# Patient Record
Sex: FEMALE | Race: WHITE | NOT HISPANIC OR LATINO | URBAN - METROPOLITAN AREA
[De-identification: names, ages, dates, MRNs, and addresses within clinical notes are randomized per-mention and may not be internally consistent; named-entity substitution may affect disease eponyms.]

---

## 2018-10-29 ENCOUNTER — HISTORICAL (OUTPATIENT)
Dept: RADIOLOGY | Facility: HOSPITAL | Age: 48
End: 2018-10-29

## 2018-12-28 ENCOUNTER — HISTORICAL (OUTPATIENT)
Dept: RADIOLOGY | Facility: HOSPITAL | Age: 48
End: 2018-12-28

## 2020-12-18 ENCOUNTER — HISTORICAL (OUTPATIENT)
Dept: RADIOLOGY | Facility: HOSPITAL | Age: 50
End: 2020-12-18

## 2020-12-24 LAB
ABS NEUT (OLG): 2.8 X10(3)/MCL (ref 1.5–6.9)
B-HCG SERPL QL: NEGATIVE
BUN SERPL-MCNC: 15 MG/DL (ref 9.8–20.1)
CALCIUM SERPL-MCNC: 9.1 MG/DL (ref 8.4–10.2)
CHLORIDE SERPL-SCNC: 104 MMOL/L (ref 98–107)
CO2 SERPL-SCNC: 26 MMOL/L (ref 22–29)
CREAT SERPL-MCNC: 0.72 MG/DL (ref 0.55–1.02)
CREAT/UREA NIT SERPL: 21
ERYTHROCYTE [DISTWIDTH] IN BLOOD BY AUTOMATED COUNT: 12.6 % (ref 11.5–17)
GLUCOSE SERPL-MCNC: 88 MG/DL (ref 74–100)
GROUP & RH: NORMAL
HCT VFR BLD AUTO: 40 % (ref 36–48)
HGB BLD-MCNC: 12.9 GM/DL (ref 12–16)
MCH RBC QN AUTO: 29 PG (ref 27–34)
MCHC RBC AUTO-ENTMCNC: 32 GM/DL (ref 31–36)
MCV RBC AUTO: 90 FL (ref 80–99)
PLATELET # BLD AUTO: 265 X10(3)/MCL (ref 140–400)
PMV BLD AUTO: 9.8 FL (ref 6.8–10)
POTASSIUM SERPL-SCNC: 3.7 MMOL/L (ref 3.5–5.1)
RBC # BLD AUTO: 4.46 X10(6)/MCL (ref 4.2–5.4)
SARS-COV-2 RNA RESP QL NAA+PROBE: NOT DETECTED
SODIUM SERPL-SCNC: 138 MMOL/L (ref 136–145)
WBC # SPEC AUTO: 5.1 X10(3)/MCL (ref 4.5–11.5)

## 2020-12-29 ENCOUNTER — HISTORICAL (OUTPATIENT)
Dept: ANESTHESIOLOGY | Facility: HOSPITAL | Age: 50
End: 2020-12-29

## 2020-12-29 LAB — B-HCG SERPL QL: NEGATIVE

## 2022-01-04 ENCOUNTER — HISTORICAL (OUTPATIENT)
Dept: RADIOLOGY | Facility: HOSPITAL | Age: 52
End: 2022-01-04

## 2022-01-06 ENCOUNTER — HISTORICAL (OUTPATIENT)
Dept: RADIOLOGY | Facility: HOSPITAL | Age: 52
End: 2022-01-06

## 2022-04-30 NOTE — OP NOTE
Patient:   Trang Shelton             MRN: [id number]            FIN: [id number]               Age:   50 years     Sex:  Female     :  1970   Associated Diagnoses:   None   Author:   Samantha Kelley MD      Operative Note   Operative Information   Date/ Time:  2020 07:22:00.     Procedures Performed: D&C/Hysteroscopy with Novasure Ablation.     Preoperative Diagnosis: MMR  Endometrial polyp.     Postoperative Diagnosis: same.     Surgeon: Samantha Kelley MD.     Anesthesia: General IV sedation.     Speciman Removed: endometrial curretings.     Description of Procedure/Findings/    Complications: The patient was taken to the operating room where general iv sedation anesthesia was obtained without difficulty.  She was prepped and draped in the usual sterile fashion in the dorsal lithotomy position.  The bladder was drained with a red rubber catheter.  At this time, a bivalve speculum was placed and the anterior lip of the cervix was grasped with single toothed tenaculum.  The uterus was sounded to 8 cm.  The cervical length was found to be 4 cm once resistance was lost with a Yuri dilator, giving us a cavity length of 4 cm.  At this time, the Yuri dilators were used to dilate the cervix up to a #16 which would accept the diagnostic hysteroscope.  The hysteroscope was then introduced into the endocervical canal to inspect the endometrial lining.  The insufflations fluid used at this time was sterile water.  The hysteroscope was removed.  Sharp curettage was performed.  At this time, the Novasure apparatus was brought to the table.  The device was then set and inserted in line with the axis of the uterus to the fundus.   The apparatus was then deployed and seated with the cavity width measured 3.1 cm.  All the measurements obtained were input in the NovaSure generator.  At this time, the machine was tested for CO2 gas leak and there was no evidence of a leak at this time. The machine was enabled  and the start button was started and then for 2 minutes a power of 68watts ablated the endometrial cavity.  The apparatus was unlocked and removed.  The patient tolerated the procedure well and was transferred to recovery in stable condition.   .     Esimated blood loss: loss less than  50  cc.     Findings: endometrial thickness with polypoid formation.     Complications: None.

## 2023-01-27 ENCOUNTER — HOSPITAL ENCOUNTER (OUTPATIENT)
Dept: RADIOLOGY | Facility: HOSPITAL | Age: 53
Discharge: HOME | End: 2023-01-27
Attending: OBSTETRICS & GYNECOLOGY
Payer: COMMERCIAL

## 2023-01-27 DIAGNOSIS — Z12.31 ENCOUNTER FOR SCREENING MAMMOGRAM FOR BREAST CANCER: ICD-10-CM

## 2023-01-27 PROCEDURE — 77063 BREAST TOMOSYNTHESIS BI: CPT | Mod: 26,,, | Performed by: STUDENT IN AN ORGANIZED HEALTH CARE EDUCATION/TRAINING PROGRAM

## 2023-01-27 PROCEDURE — 77067 SCR MAMMO BI INCL CAD: CPT | Mod: TC

## 2023-01-27 PROCEDURE — 77067 SCR MAMMO BI INCL CAD: CPT | Mod: 26,,, | Performed by: STUDENT IN AN ORGANIZED HEALTH CARE EDUCATION/TRAINING PROGRAM

## 2023-01-27 PROCEDURE — 77063 MAMMO DIGITAL SCREENING BILAT WITH TOMO: ICD-10-PCS | Mod: 26,,, | Performed by: STUDENT IN AN ORGANIZED HEALTH CARE EDUCATION/TRAINING PROGRAM

## 2023-01-27 PROCEDURE — 77067 MAMMO DIGITAL SCREENING BILAT WITH TOMO: ICD-10-PCS | Mod: 26,,, | Performed by: STUDENT IN AN ORGANIZED HEALTH CARE EDUCATION/TRAINING PROGRAM

## 2024-01-30 DIAGNOSIS — Z12.31 ENCOUNTER FOR SCREENING MAMMOGRAM FOR MALIGNANT NEOPLASM OF BREAST: Primary | ICD-10-CM

## 2024-01-31 ENCOUNTER — HOSPITAL ENCOUNTER (OUTPATIENT)
Dept: RADIOLOGY | Facility: HOSPITAL | Age: 54
Discharge: HOME | End: 2024-01-31
Attending: OBSTETRICS & GYNECOLOGY
Payer: COMMERCIAL

## 2024-01-31 DIAGNOSIS — Z12.31 ENCOUNTER FOR SCREENING MAMMOGRAM FOR MALIGNANT NEOPLASM OF BREAST: ICD-10-CM

## 2024-01-31 PROCEDURE — 77067 SCR MAMMO BI INCL CAD: CPT | Mod: TC

## 2024-01-31 PROCEDURE — 77067 SCR MAMMO BI INCL CAD: CPT | Mod: 26,,, | Performed by: RADIOLOGY

## 2024-01-31 PROCEDURE — 77063 BREAST TOMOSYNTHESIS BI: CPT | Mod: 26,,, | Performed by: RADIOLOGY

## 2024-04-09 ENCOUNTER — HOSPITAL ENCOUNTER (EMERGENCY)
Facility: HOSPITAL | Age: 54
Discharge: HOME | End: 2024-04-09
Attending: INTERNAL MEDICINE
Payer: COMMERCIAL

## 2024-04-09 VITALS
WEIGHT: 137 LBS | SYSTOLIC BLOOD PRESSURE: 135 MMHG | DIASTOLIC BLOOD PRESSURE: 82 MMHG | HEIGHT: 67 IN | TEMPERATURE: 99 F | HEART RATE: 64 BPM | RESPIRATION RATE: 16 BRPM | BODY MASS INDEX: 21.5 KG/M2 | OXYGEN SATURATION: 95 %

## 2024-04-09 DIAGNOSIS — N20.1 URETEROLITHIASIS: Primary | ICD-10-CM

## 2024-04-09 DIAGNOSIS — R10.9 RIGHT FLANK PAIN: ICD-10-CM

## 2024-04-09 DIAGNOSIS — N20.0 KIDNEY STONE: ICD-10-CM

## 2024-04-09 LAB
ALBUMIN SERPL-MCNC: 3.9 G/DL (ref 3.5–5)
ALBUMIN/GLOB SERPL: 1.3 RATIO (ref 1.1–2)
ALP SERPL-CCNC: 121 UNIT/L (ref 40–150)
ALT SERPL-CCNC: 20 UNIT/L (ref 0–55)
APPEARANCE UR: CLEAR
AST SERPL-CCNC: 22 UNIT/L (ref 5–34)
BACTERIA #/AREA URNS AUTO: ABNORMAL /HPF
BASOPHILS # BLD AUTO: 0.01 X10(3)/MCL
BASOPHILS NFR BLD AUTO: 0.2 %
BILIRUB SERPL-MCNC: 0.4 MG/DL
BILIRUB UR QL STRIP.AUTO: NEGATIVE
BUN SERPL-MCNC: 22 MG/DL (ref 9.8–20.1)
CALCIUM SERPL-MCNC: 9.9 MG/DL (ref 8.4–10.2)
CHLORIDE SERPL-SCNC: 110 MMOL/L (ref 98–107)
CO2 SERPL-SCNC: 23 MMOL/L (ref 22–29)
COLOR UR AUTO: YELLOW
CREAT SERPL-MCNC: 0.73 MG/DL (ref 0.55–1.02)
EOSINOPHIL # BLD AUTO: 0 X10(3)/MCL (ref 0–0.9)
EOSINOPHIL NFR BLD AUTO: 0 %
ERYTHROCYTE [DISTWIDTH] IN BLOOD BY AUTOMATED COUNT: 12.2 % (ref 11.5–17)
GFR SERPLBLD CREATININE-BSD FMLA CKD-EPI: >60 MLS/MIN/1.73/M2
GLOBULIN SER-MCNC: 3.1 GM/DL (ref 2.4–3.5)
GLUCOSE SERPL-MCNC: 159 MG/DL (ref 74–100)
GLUCOSE UR QL STRIP.AUTO: NEGATIVE
HCT VFR BLD AUTO: 38.4 % (ref 37–47)
HGB BLD-MCNC: 12.5 G/DL (ref 12–16)
IMM GRANULOCYTES # BLD AUTO: 0.04 X10(3)/MCL (ref 0–0.04)
IMM GRANULOCYTES NFR BLD AUTO: 0.8 %
KETONES UR QL STRIP.AUTO: NEGATIVE
LACTATE SERPL-SCNC: 1.5 MMOL/L (ref 0.5–2.2)
LEUKOCYTE ESTERASE UR QL STRIP.AUTO: NEGATIVE
LYMPHOCYTES # BLD AUTO: 0.61 X10(3)/MCL (ref 0.6–4.6)
LYMPHOCYTES NFR BLD AUTO: 11.5 %
MCH RBC QN AUTO: 28.8 PG (ref 27–31)
MCHC RBC AUTO-ENTMCNC: 32.6 G/DL (ref 33–36)
MCV RBC AUTO: 88.5 FL (ref 80–94)
MONOCYTES # BLD AUTO: 0.29 X10(3)/MCL (ref 0.1–1.3)
MONOCYTES NFR BLD AUTO: 5.5 %
MUCOUS THREADS URNS QL MICRO: ABNORMAL /LPF
NEUTROPHILS # BLD AUTO: 4.35 X10(3)/MCL (ref 2.1–9.2)
NEUTROPHILS NFR BLD AUTO: 82 %
NITRITE UR QL STRIP.AUTO: NEGATIVE
PH UR STRIP.AUTO: 5.5 [PH]
PLATELET # BLD AUTO: 217 X10(3)/MCL (ref 130–400)
PMV BLD AUTO: 10 FL (ref 7.4–10.4)
POTASSIUM SERPL-SCNC: 4.2 MMOL/L (ref 3.5–5.1)
PROT SERPL-MCNC: 7 GM/DL (ref 6.4–8.3)
PROT UR QL STRIP.AUTO: ABNORMAL
RBC # BLD AUTO: 4.34 X10(6)/MCL (ref 4.2–5.4)
RBC #/AREA URNS AUTO: ABNORMAL /HPF
RBC UR QL AUTO: ABNORMAL
SODIUM SERPL-SCNC: 138 MMOL/L (ref 136–145)
SP GR UR STRIP.AUTO: >=1.03 (ref 1–1.03)
SQUAMOUS #/AREA URNS AUTO: ABNORMAL /HPF
UROBILINOGEN UR STRIP-ACNC: 0.2
WBC # SPEC AUTO: 5.3 X10(3)/MCL (ref 4.5–11.5)
WBC #/AREA URNS AUTO: ABNORMAL /HPF

## 2024-04-09 PROCEDURE — 25000003 PHARM REV CODE 250: Performed by: INTERNAL MEDICINE

## 2024-04-09 PROCEDURE — 63600175 PHARM REV CODE 636 W HCPCS: Performed by: INTERNAL MEDICINE

## 2024-04-09 PROCEDURE — 80053 COMPREHEN METABOLIC PANEL: CPT | Performed by: INTERNAL MEDICINE

## 2024-04-09 PROCEDURE — 96361 HYDRATE IV INFUSION ADD-ON: CPT

## 2024-04-09 PROCEDURE — 81001 URINALYSIS AUTO W/SCOPE: CPT | Performed by: INTERNAL MEDICINE

## 2024-04-09 PROCEDURE — 96374 THER/PROPH/DIAG INJ IV PUSH: CPT

## 2024-04-09 PROCEDURE — 99285 EMERGENCY DEPT VISIT HI MDM: CPT | Mod: 25

## 2024-04-09 PROCEDURE — 83605 ASSAY OF LACTIC ACID: CPT | Performed by: INTERNAL MEDICINE

## 2024-04-09 PROCEDURE — 85025 COMPLETE CBC W/AUTO DIFF WBC: CPT | Performed by: INTERNAL MEDICINE

## 2024-04-09 RX ORDER — TAMSULOSIN HYDROCHLORIDE 0.4 MG/1
0.4 CAPSULE ORAL DAILY
Qty: 10 CAPSULE | Refills: 0 | Status: SHIPPED | OUTPATIENT
Start: 2024-04-09 | End: 2024-05-22

## 2024-04-09 RX ORDER — OXYCODONE AND ACETAMINOPHEN 10; 325 MG/1; MG/1
1 TABLET ORAL EVERY 6 HOURS PRN
Qty: 12 TABLET | Refills: 0 | Status: SHIPPED | OUTPATIENT
Start: 2024-04-09 | End: 2024-04-13

## 2024-04-09 RX ORDER — SODIUM CHLORIDE 9 MG/ML
1000 INJECTION, SOLUTION INTRAVENOUS
Status: COMPLETED | OUTPATIENT
Start: 2024-04-09 | End: 2024-04-09

## 2024-04-09 RX ORDER — ONDANSETRON 4 MG/1
4 TABLET, ORALLY DISINTEGRATING ORAL EVERY 6 HOURS PRN
Qty: 20 TABLET | Refills: 0 | Status: SHIPPED | OUTPATIENT
Start: 2024-04-09 | End: 2024-04-14

## 2024-04-09 RX ORDER — TAMSULOSIN HYDROCHLORIDE 0.4 MG/1
0.4 CAPSULE ORAL
Status: COMPLETED | OUTPATIENT
Start: 2024-04-09 | End: 2024-04-09

## 2024-04-09 RX ORDER — KETOROLAC TROMETHAMINE 30 MG/ML
30 INJECTION, SOLUTION INTRAMUSCULAR; INTRAVENOUS
Status: COMPLETED | OUTPATIENT
Start: 2024-04-09 | End: 2024-04-09

## 2024-04-09 RX ORDER — OXYCODONE AND ACETAMINOPHEN 5; 325 MG/1; MG/1
2 TABLET ORAL ONCE
Status: COMPLETED | OUTPATIENT
Start: 2024-04-09 | End: 2024-04-09

## 2024-04-09 RX ADMIN — OXYCODONE HYDROCHLORIDE AND ACETAMINOPHEN 2 TABLET: 5; 325 TABLET ORAL at 02:04

## 2024-04-09 RX ADMIN — SODIUM CHLORIDE 1000 ML: 9 INJECTION, SOLUTION INTRAVENOUS at 01:04

## 2024-04-09 RX ADMIN — TAMSULOSIN HYDROCHLORIDE 0.4 MG: 0.4 CAPSULE ORAL at 02:04

## 2024-04-09 RX ADMIN — KETOROLAC TROMETHAMINE 30 MG: 30 INJECTION, SOLUTION INTRAMUSCULAR at 01:04

## 2024-04-09 NOTE — ED PROVIDER NOTES
Encounter Date: 4/9/2024       History     Chief Complaint   Patient presents with    Flank Pain     Pain started this am. Seen by Tiana CANO this morning and the pain is getting worse.      53-year-old white female with right flank pain.  She was seen in my clinic this morning he was treated for URI and urine was checked at that time which showed no bacteria however she would some blood in her urine and the question was she developing kidney stone however she would more respiratory symptoms so she was treated with a URI sent home and told to come to emergency department if she worsens in any way she woke up around midnight stating that the pain continued to get so bad she could not get comfortable so she came to the ER for evaluation      Review of patient's allergies indicates:  No Known Allergies  History reviewed. No pertinent past medical history.  History reviewed. No pertinent surgical history.  History reviewed. No pertinent family history.     Review of Systems   Constitutional: Negative.  Negative for activity change, appetite change, chills, diaphoresis, fatigue, fever and unexpected weight change.   HENT: Negative.  Negative for congestion, dental problem, drooling, ear discharge, ear pain, facial swelling, hearing loss, mouth sores, nosebleeds, postnasal drip, rhinorrhea, sinus pressure, sinus pain, sneezing, sore throat, tinnitus, trouble swallowing and voice change.    Eyes: Negative.  Negative for photophobia, pain, discharge, redness, itching and visual disturbance.   Respiratory: Negative.  Negative for apnea, cough, choking, chest tightness, shortness of breath, wheezing and stridor.    Cardiovascular: Negative.  Negative for chest pain, palpitations and leg swelling.   Gastrointestinal: Negative.  Negative for abdominal distention, abdominal pain, anal bleeding, blood in stool, constipation, diarrhea, nausea, rectal pain and vomiting.   Endocrine: Negative.  Negative for cold intolerance, heat  intolerance, polydipsia, polyphagia and polyuria.   Genitourinary:  Positive for flank pain. Negative for decreased urine volume, difficulty urinating, dyspareunia, dysuria, enuresis, frequency, genital sores, hematuria, menstrual problem, pelvic pain, urgency, vaginal bleeding, vaginal discharge and vaginal pain.   Musculoskeletal:  Negative for arthralgias, back pain, gait problem, joint swelling, myalgias, neck pain and neck stiffness.   Skin: Negative.  Negative for color change, pallor, rash and wound.   Allergic/Immunologic: Negative.  Negative for environmental allergies, food allergies and immunocompromised state.   Neurological: Negative.  Negative for dizziness, tremors, seizures, syncope, facial asymmetry, speech difficulty, weakness, light-headedness, numbness and headaches.   Hematological: Negative.  Negative for adenopathy. Does not bruise/bleed easily.   Psychiatric/Behavioral: Negative.  Negative for agitation, behavioral problems, confusion, decreased concentration, dysphoric mood, hallucinations, self-injury, sleep disturbance and suicidal ideas. The patient is not nervous/anxious and is not hyperactive.    All other systems reviewed and are negative.      Physical Exam     Initial Vitals [04/09/24 0057]   BP Pulse Resp Temp SpO2   (!) 154/80 89 16 98.8 °F (37.1 °C) 97 %      MAP       --         Physical Exam    Nursing note and vitals reviewed.  Constitutional: She appears well-developed and well-nourished. She is not diaphoretic. No distress.   HENT:   Head: Normocephalic and atraumatic.   Mouth/Throat: Oropharynx is clear and moist. No oropharyngeal exudate.   Eyes: Conjunctivae and EOM are normal. Pupils are equal, round, and reactive to light. No scleral icterus.   Neck: Neck supple. No JVD present.   Normal range of motion.  Cardiovascular:  Normal rate, regular rhythm, normal heart sounds and intact distal pulses.     Exam reveals no gallop and no friction rub.       No murmur  heard.  Pulmonary/Chest: Breath sounds normal. No respiratory distress. She has no wheezes. She exhibits no tenderness.   Abdominal: Abdomen is soft. Bowel sounds are normal. She exhibits no distension. There is no abdominal tenderness.     There is no rebound.   Musculoskeletal:         General: Normal range of motion.      Cervical back: Normal range of motion and neck supple.     Neurological: She is alert and oriented to person, place, and time. She has normal strength.   Skin: Skin is warm and dry. Capillary refill takes less than 2 seconds.   Psychiatric: She has a normal mood and affect. Her behavior is normal. Judgment and thought content normal.         ED Course   Procedures  Admission on 04/09/2024   Component Date Value Ref Range Status    Sodium Level 04/09/2024 138  136 - 145 mmol/L Final    Potassium Level 04/09/2024 4.2  3.5 - 5.1 mmol/L Final    Chloride 04/09/2024 110 (H)  98 - 107 mmol/L Final    Carbon Dioxide 04/09/2024 23  22 - 29 mmol/L Final    Glucose Level 04/09/2024 159 (H)  74 - 100 mg/dL Final    Blood Urea Nitrogen 04/09/2024 22.0 (H)  9.8 - 20.1 mg/dL Final    Creatinine 04/09/2024 0.73  0.55 - 1.02 mg/dL Final    Calcium Level Total 04/09/2024 9.9  8.4 - 10.2 mg/dL Final    Protein Total 04/09/2024 7.0  6.4 - 8.3 gm/dL Final    Albumin Level 04/09/2024 3.9  3.5 - 5.0 g/dL Final    Globulin 04/09/2024 3.1  2.4 - 3.5 gm/dL Final    Albumin/Globulin Ratio 04/09/2024 1.3  1.1 - 2.0 ratio Final    Bilirubin Total 04/09/2024 0.4  <=1.5 mg/dL Final    Alkaline Phosphatase 04/09/2024 121  40 - 150 unit/L Final    Alanine Aminotransferase 04/09/2024 20  0 - 55 unit/L Final    Aspartate Aminotransferase 04/09/2024 22  5 - 34 unit/L Final    eGFR 04/09/2024 >60  mls/min/1.73/m2 Final    Color, UA 04/09/2024 Yellow  Yellow, Light-Yellow, Dark Yellow, Yina, Straw Final    Appearance, UA 04/09/2024 Clear  Clear Final    Specific Gravity, UA 04/09/2024 >=1.030  1.005 - 1.030 Final    pH, UA  04/09/2024 5.5  5.0 - 8.5 Final    Protein, UA 04/09/2024 1+ (A)  Negative Final    Glucose, UA 04/09/2024 Negative  Negative, Normal Final    Ketones, UA 04/09/2024 Negative  Negative Final    Blood, UA 04/09/2024 2+ (A)  Negative Final    Bilirubin, UA 04/09/2024 Negative  Negative Final    Urobilinogen, UA 04/09/2024 0.2  0.2, 1.0, Normal Final    Nitrites, UA 04/09/2024 Negative  Negative Final    Leukocyte Esterase, UA 04/09/2024 Negative  Negative Final    Lactic Acid Level 04/09/2024 1.5  0.5 - 2.2 mmol/L Final    WBC 04/09/2024 5.30  4.50 - 11.50 x10(3)/mcL Final    RBC 04/09/2024 4.34  4.20 - 5.40 x10(6)/mcL Final    Hgb 04/09/2024 12.5  12.0 - 16.0 g/dL Final    Hct 04/09/2024 38.4  37.0 - 47.0 % Final    MCV 04/09/2024 88.5  80.0 - 94.0 fL Final    MCH 04/09/2024 28.8  27.0 - 31.0 pg Final    MCHC 04/09/2024 32.6 (L)  33.0 - 36.0 g/dL Final    RDW 04/09/2024 12.2  11.5 - 17.0 % Final    Platelet 04/09/2024 217  130 - 400 x10(3)/mcL Final    MPV 04/09/2024 10.0  7.4 - 10.4 fL Final    Neut % 04/09/2024 82.0  % Final    Lymph % 04/09/2024 11.5  % Final    Mono % 04/09/2024 5.5  % Final    Eos % 04/09/2024 0.0  % Final    Basophil % 04/09/2024 0.2  % Final    Lymph # 04/09/2024 0.61  0.6 - 4.6 x10(3)/mcL Final    Neut # 04/09/2024 4.35  2.1 - 9.2 x10(3)/mcL Final    Mono # 04/09/2024 0.29  0.1 - 1.3 x10(3)/mcL Final    Eos # 04/09/2024 0.00  0 - 0.9 x10(3)/mcL Final    Baso # 04/09/2024 0.01  <=0.2 x10(3)/mcL Final    IG# 04/09/2024 0.04  0 - 0.04 x10(3)/mcL Final    IG% 04/09/2024 0.8  % Final    Bacteria, UA 04/09/2024 None Seen  None Seen, Rare, Occasional /HPF Final    Mucous, UA 04/09/2024 Trace (A)  None Seen /LPF Final    RBC, UA 04/09/2024 3-5  None Seen, 0-2, 3-5, 0-5 /HPF Final    WBC, UA 04/09/2024 None Seen  None Seen, 0-2, 3-5, 0-5 /HPF Final    Squamous Epithelial Cells, UA 04/09/2024 Rare  None Seen, Rare, Occasional, Occ /HPF Final       Labs Reviewed   COMPREHENSIVE METABOLIC PANEL -  Abnormal; Notable for the following components:       Result Value    Chloride 110 (*)     Glucose Level 159 (*)     Blood Urea Nitrogen 22.0 (*)     All other components within normal limits   URINALYSIS, REFLEX TO URINE CULTURE - Abnormal; Notable for the following components:    Protein, UA 1+ (*)     Blood, UA 2+ (*)     All other components within normal limits   CBC WITH DIFFERENTIAL - Abnormal; Notable for the following components:    MCHC 32.6 (*)     All other components within normal limits   URINALYSIS, MICROSCOPIC - Abnormal; Notable for the following components:    Mucous, UA Trace (*)     All other components within normal limits   LACTIC ACID, PLASMA - Normal   CBC W/ AUTO DIFFERENTIAL    Narrative:     The following orders were created for panel order CBC auto differential.  Procedure                               Abnormality         Status                     ---------                               -----------         ------                     CBC with Differential[[id number]]       Abnormal            Final result                 Please view results for these tests on the individual orders.          Imaging Results              CT Renal Stone Study ABD Pelvis WO (Preliminary result)  Result time 04/09/24 01:58:24      Preliminary result by Mckay Caro MD (04/09/24 01:58:24)                   Narrative:    START OF REPORT:  Technique: CT of the abdomen and pelvis was performed with axial images as well as sagittal and coronal reconstruction images without intravenous contrast renal stone protocol.    Comparison: None available.    Clinical History: Flank pain.    Dosage Information: Automated Exposure Control was utilized 215.48 mGy.cm.    Findings:  Lines and Tubes: None.  Thorax:  Lungs: There is a 1.9 cm masslike lesion with partially spiculated margins in the right medial basal lung segment seen on series 2, image 22. This is suspicious for a pulmonary neoplasm.  Pleura: No effusions or  thickening.  Heart: The heart size is within normal limits.  Abdomen:  Abdominal Wall: There is a small uncomplicated umbilical hernia which contains mesenteric fat.  Liver: The liver appears unremarkable.  Biliary System: No intrahepatic or extrahepatic biliary duct dilatation is seen.  Gallbladder: The gallbladder appears unremarkable with no stones wall thickening or pericholecystic inflammatory changes or fluid.  Pancreas: The pancreas appears unremarkable.  Spleen: The spleen appears unremarkable.  Adrenals: The adrenal glands appear unremarkable.  Kidneys: Multiple nonobstructive intrarenal kidney stones are seen in the left kidney. The left kidney otherwise appears unremarkable with no cysts masses or hydronephrosis identified. Multiple nonobstructive intrarenal kidney stones are seen in the right kidney. There is mild right hydroureteronephrosis due to a 3.5 mm obstructive stone at the ureterovesical junction seen on image 130, series 2.  Aorta: The abdominal aorta appears unremarkable.  IVC: Unremarkable.  Bowel:  Esophagus: The visualized esophagus appears unremarkable.  Stomach: The stomach appears unremarkable.  Duodenum: Unremarkable appearing duodenum.  Small Bowel: The small bowel appears unremarkable.  Colon: Nondistended.  Appendix: The appendix appears unremarkable and is partially seen on Image 106, Series 2.  Peritoneum: No intraperitoneal free air or ascites is seen.    Pelvis:  Bladder: The bladder is nondistended and cannot be definitively evaluated.  Female:  Uterus: The uterus appears unremarkable for age.  Ovaries: No adnexal masses are seen.    Bony structures:  Dorsal Spine: The visualized dorsal spine appears unremarkable.  Bony Pelvis: The visualized bony structures of the pelvis appear unremarkable.      Impression:  1. There is a 1.9 cm masslike lesion with partially spiculated margins in the right medial basal lung segment seen on series 2, image 22. This is suspicious for a  pulmonary neoplasm. Clinical correlation is recommended as regards additional evaluation and followup.  2. There is mild right hydroureteronephrosis due to a 3.5 mm obstructive stone at the ureterovesical junction seen on image 130, series 2. Clinical correlation and followup until complete interval resolution is recommended.  3. Details and other findings as discussed above.                                         Medications   oxyCODONE-acetaminophen 5-325 mg per tablet 2 tablet (has no administration in time range)   ketorolac injection 30 mg (30 mg Intravenous Given 4/9/24 0116)   0.9%  NaCl infusion (1,000 mLs Intravenous New Bag 4/9/24 0128)     Medical Decision Making  53-year-old white female with right flank pain.  Differential diagnosis is nephrolithiasis, ureterolithiasis, hydronephrosis, urinary tract infection, pyelonephritis, appendicitis, diverticulitis.  Workup included blood work and urinalysis.  Urinalysis did have some blood so she was sent through the CT scanner which time it shows a 3.5 mm stone at the UVJ that appears to be mildly obstructing causing some mild ureteral hydronephrosis.  Patient was notified of these results also the CT scan showed some concerning for possible neoplasm in the right lung and when I discussed this with her she states that while she was in [place] they noted something and did a workup for about a year and was told it was stable but we do not have anything to compare that here and she states she will get copies of those    Problems Addressed:  Kidney stone: acute illness or injury with systemic symptoms  Right flank pain: acute illness or injury  Ureterolithiasis: acute illness or injury with systemic symptoms    Amount and/or Complexity of Data Reviewed  External Data Reviewed: labs and notes.  Labs: ordered. Decision-making details documented in ED Course.  Radiology: ordered. Decision-making details documented in ED Course.    Risk  OTC drugs.  Prescription  drug management.  Parenteral controlled substances.                                      Clinical Impression:  Final diagnoses:  [N20.1] Ureterolithiasis (Primary)  [N20.0] Kidney stone  [R10.9] Right flank pain          ED Disposition Condition    Discharge Stable          ED Prescriptions       Medication Sig Dispense Start Date End Date Auth. Provider    oxyCODONE-acetaminophen (PERCOCET)  mg per tablet Take 1 tablet by mouth every 6 (six) hours as needed for Pain. 12 tablet 4/9/2024 4/13/2024 Russ Betancur MD    tamsulosin (FLOMAX) 0.4 mg Cap Take 1 capsule (0.4 mg total) by mouth once daily. 10 capsule 4/9/2024 4/9/2025 Russ Betancur MD    ondansetron (ZOFRAN-ODT) 4 MG TbDL Take 1 tablet (4 mg total) by mouth every 6 (six) hours as needed. 20 tablet 4/9/2024 4/14/2024 Russ Betancur MD          Follow-up Information       Follow up With Specialties Details Why Contact Info    Russ Betancur MD Internal Medicine In 1 week  [address] [place] [id number]  [phone]              Kellen Carranza is a certified MA and was present during the entire interaction with this patient      Russ Betancur MD  04/09/24 [id number]

## 2024-04-11 ENCOUNTER — HOSPITAL ENCOUNTER (OUTPATIENT)
Dept: RADIOLOGY | Facility: HOSPITAL | Age: 54
Discharge: HOME | End: 2024-04-11
Attending: FAMILY MEDICINE
Payer: COMMERCIAL

## 2024-04-11 DIAGNOSIS — R91.1 SOLITARY PULMONARY NODULE: ICD-10-CM

## 2024-04-11 PROCEDURE — 71260 CT THORAX DX C+: CPT | Mod: TC

## 2024-04-11 PROCEDURE — 25500020 PHARM REV CODE 255: Performed by: FAMILY MEDICINE

## 2024-04-11 RX ADMIN — IOPAMIDOL 100 ML: 755 INJECTION, SOLUTION INTRAVENOUS at 11:04

## 2024-05-22 ENCOUNTER — OFFICE VISIT (OUTPATIENT)
Dept: CARDIAC SURGERY | Facility: CLINIC | Age: 54
End: 2024-05-22
Payer: COMMERCIAL

## 2024-05-22 VITALS
WEIGHT: 135 LBS | BODY MASS INDEX: 21.19 KG/M2 | HEIGHT: 67 IN | SYSTOLIC BLOOD PRESSURE: 132 MMHG | OXYGEN SATURATION: 100 % | DIASTOLIC BLOOD PRESSURE: 80 MMHG | HEART RATE: 71 BPM

## 2024-05-22 DIAGNOSIS — R93.89 ABNORMAL CT SCAN, CHEST: ICD-10-CM

## 2024-05-22 DIAGNOSIS — R91.1 LUNG NODULE: Primary | ICD-10-CM

## 2024-05-22 DIAGNOSIS — Z86.2 HISTORY OF HYPERCOAGULABLE STATE: ICD-10-CM

## 2024-05-22 PROCEDURE — 3079F DIAST BP 80-89 MM HG: CPT | Mod: CPTII,,, | Performed by: THORACIC SURGERY (CARDIOTHORACIC VASCULAR SURGERY)

## 2024-05-22 PROCEDURE — 3008F BODY MASS INDEX DOCD: CPT | Mod: CPTII,,, | Performed by: THORACIC SURGERY (CARDIOTHORACIC VASCULAR SURGERY)

## 2024-05-22 PROCEDURE — 3075F SYST BP GE 130 - 139MM HG: CPT | Mod: CPTII,,, | Performed by: THORACIC SURGERY (CARDIOTHORACIC VASCULAR SURGERY)

## 2024-05-22 PROCEDURE — 1160F RVW MEDS BY RX/DR IN RCRD: CPT | Mod: CPTII,,, | Performed by: THORACIC SURGERY (CARDIOTHORACIC VASCULAR SURGERY)

## 2024-05-22 PROCEDURE — 99204 OFFICE O/P NEW MOD 45 MIN: CPT | Mod: ,,, | Performed by: THORACIC SURGERY (CARDIOTHORACIC VASCULAR SURGERY)

## 2024-05-22 PROCEDURE — 1159F MED LIST DOCD IN RCRD: CPT | Mod: CPTII,,, | Performed by: THORACIC SURGERY (CARDIOTHORACIC VASCULAR SURGERY)

## 2024-05-22 RX ORDER — OMEPRAZOLE 40 MG/1
40 CAPSULE, DELAYED RELEASE ORAL EVERY MORNING
COMMUNITY
Start: 2024-04-17

## 2024-05-22 NOTE — PROGRESS NOTES
"History & Physical    SUBJECTIVE:     History of Present Illness:  The patient is presenting for evaluation for right lower lobe lung mass.  This has been there 11 years ago.  Recent CT was done revealing the same mass.  She was sent to me for possible intervention.  She has history of hypercoagulable disorder and she has not on any blood thinners at that time.  Apparently she only took it around her pregnancy      Chief Complaint   Patient presents with    Pre-op Exam     REFERRAL-DR. VARGAS-RT LOWER LOBE NODULE 2.2 CM (PREV 12 MM 12 YRS AGO). 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       No current facility-administered medications for this visit.       History reviewed. No pertinent past medical history.  Past Surgical History:   Procedure Laterality Date    AUGMENTATION OF BREAST      LASIK       Family History   Problem Relation Name Age of Onset    Lymphoma Mother      Ovarian cancer Paternal Aunt      Stomach cancer Maternal Aunt       Social History     Tobacco Use    Smoking status: Never    Smokeless tobacco: Never        Review of Systems:  Review of Systems   Constitutional: Negative.    HENT: Negative.     Eyes: Negative.    Respiratory: Negative.     Cardiovascular: Negative.    Gastrointestinal: Negative.    Endocrine: Negative.    Genitourinary: Negative.    Musculoskeletal: Negative.         Claudications   Skin: Negative.    Allergic/Immunologic: Negative.    Neurological: Negative.    Hematological: Negative.    Psychiatric/Behavioral: Negative.         OBJECTIVE:     Vital Signs (Most Recent)  Pulse: 71 (05/22/24 0814)  BP: 132/80 (05/22/24 0814)  SpO2: 100 % (05/22/24 0814)  5' 7" (1.702 m)  61.2 kg (135 lb)     Physical Exam:  Physical Exam  Vitals reviewed. "   Constitutional:       Appearance: Normal appearance.   HENT:      Head: Normocephalic and atraumatic.      Nose: Nose normal.      Mouth/Throat:      Mouth: Mucous membranes are dry.      Pharynx: Oropharynx is clear.   Eyes:      Extraocular Movements: Extraocular movements intact.      Conjunctiva/sclera: Conjunctivae normal.      Pupils: Pupils are equal, round, and reactive to light.   Cardiovascular:      Rate and Rhythm: Normal rate and regular rhythm.      Pulses: Normal pulses.   Pulmonary:      Effort: Pulmonary effort is normal.      Breath sounds: Normal breath sounds.   Abdominal:      General: Abdomen is flat.      Palpations: Abdomen is soft.   Musculoskeletal:         General: Normal range of motion.      Cervical back: Neck supple.   Skin:     General: Skin is warm and dry.   Neurological:      General: No focal deficit present.   Psychiatric:         Mood and Affect: Mood normal.         Laboratory:  None      Diagnostic Results:  CTs reviewed from this year and 11 years ago      ASSESSMENT/PLAN:     Right lower lobe lung mass.  Very unlikely to be high-grade cancer at this time.  I believe however that she will benefit from VATS biopsies and in the very unlikely possibility that that is lung cancer right lower lobectomy.  She understands the risks and benefits of the procedure.  She will benefit from pulmonary function tests and a hematology consult prior to the procedure

## 2024-05-24 NOTE — PROGRESS NOTES
"Subjective:       Patient ID: Trang Shelton is a 53 y.o. female.    Chief Complaint: New Patient    Diagnosis: Heterozygous Factor V Leiden    Current Treatment: None    Treatment History: N/A    Interval History:   54 yo presents in May '24 for evaluation of hypercoagulable disorder prior to lung biopsy/possible resection for lung mass. In  after experiencing 3 miscarriages she had a hypercoag workup with  with GYN who told her she was at increased risk of blood clots. She became pregnant through IVF 2x and both pregnancies she took Heparin/Lovenox prophylactically without issue. She has no history of any thrombosis of any kind nor any family history of DVT/PE. She has had previous surgeries including breast impantation and removal without issue or additional anticoagulation. She has never taken anticoagulation outside of pregnancy.     After reviewed of her hypercoag workup done in  she is negative for APLA, Prothrombin, and Antithrombin mutations/abnormalities. She is heterozygous for FVL. She has 2 other genetic abnormalities that reportedly "could be linked to increased thrombosis risk" however this is not clinically significant.     I discussed her CBC findings and how they differ from expected values. We discussed the possible etiology for her findings including both benign and malignant causes. I explained the role for initial evaluation with blood work and possibly secondary evaluation with a bone marrow biopsy. All questions were answered at the time of the visit. She is agreeable to proceed with the plan.      History reviewed. No pertinent past medical history.   Past Surgical History:   Procedure Laterality Date    AUGMENTATION OF BREAST      BREAST SURGERY  2018     SECTION   and     LASIK      TUBAL LIGATION  2009     Social History     Socioeconomic History    Marital status:    Tobacco Use    Smoking status: Never    Smokeless tobacco: Never   Substance and " Sexual Activity    Alcohol use: Never    Drug use: Never    Sexual activity: Yes     Partners: Male     Birth control/protection: Post-menopausal      Family History   Problem Relation Name Age of Onset    Lymphoma Mother Diana Amor     Cancer Mother Diana Amor     Ovarian cancer Paternal Aunt      Stomach cancer Maternal Aunt      Diabetes Father Jorge Amor       Review of patient's allergies indicates:  No Known Allergies   Review of Systems   Constitutional:  Negative for appetite change and unexpected weight change.   HENT:  Negative for mouth sores.    Eyes:  Negative for visual disturbance.   Respiratory:  Negative for cough and shortness of breath.    Cardiovascular:  Negative for chest pain.   Gastrointestinal:  Negative for abdominal pain and diarrhea.   Genitourinary:  Negative for frequency.   Musculoskeletal:  Negative for back pain.   Integumentary:  Negative for rash.   Neurological:  Negative for headaches.   Hematological:  Negative for adenopathy.   Psychiatric/Behavioral:  The patient is not nervous/anxious.          Objective:      Vitals:    05/28/24 0820   BP: 124/80   Pulse: 68   Resp: 18   Temp: 97.5 °F (36.4 °C)       Physical Exam  Constitutional:       General: She is not in acute distress.     Appearance: Normal appearance. She is not ill-appearing.   HENT:      Head: Normocephalic and atraumatic.      Nose: Nose normal.      Mouth/Throat:      Mouth: Mucous membranes are moist.      Pharynx: Oropharynx is clear.   Eyes:      Extraocular Movements: Extraocular movements intact.      Conjunctiva/sclera: Conjunctivae normal.      Pupils: Pupils are equal, round, and reactive to light.   Cardiovascular:      Rate and Rhythm: Normal rate and regular rhythm.      Pulses: Normal pulses.      Heart sounds: Normal heart sounds. No murmur heard.  Pulmonary:      Effort: Pulmonary effort is normal. No respiratory distress.      Breath sounds: Normal breath sounds.   Abdominal:       General: There is no distension.      Palpations: Abdomen is soft.      Tenderness: There is no abdominal tenderness.   Musculoskeletal:         General: Normal range of motion.      Cervical back: Normal range of motion and neck supple.      Right lower leg: No edema.      Left lower leg: No edema.   Lymphadenopathy:      Cervical: No cervical adenopathy.   Skin:     General: Skin is warm and dry.   Neurological:      General: No focal deficit present.      Mental Status: She is alert and oriented to person, place, and time.         LABS AND IMAGING REVIEWED IN EPIC    IMAGIN24 CT Chest:  Impression:  1. 2.2 cm lobulated spiculated nodular focus posteromedial right lower lung.  A neoplastic etiology must be considered  2. Thoracic spondylosis with 8 mm sclerotic nodule at T9  3. 4 mm low-attenuation nodule lower left thyroid lobe    Assessment:   Heterozygous Factor V Leiden - No personal history or family history of thrombosis. Had 3 miscarriages which prompted workup in '07.       Plan:   - Will discuss post op surgical plan with Dr. Clementina Raymond from hematology perspective to undergo surgical procedure, no additional/increased need for anticoagulation. Recommend ambulation as able after procedure.   - RTC 3 weeks MD visit, labs    I spent a total of 55 minutes on the day of the visit.This includes face to face time and non-face to face time preparing to see the patient (eg, review of tests), obtaining and/or reviewing separately obtained history, documenting clinical information in the electronic or other health record, independently interpreting results and communicating results to the patient/family/caregiver, or care coordinator.    Elizabeth Kennedy LeJeune, MD  Hematology/Oncology   [facility]        Professional Services   I, Linda Don LPN, acted solely as a scribe for and in the presence of Dr. Elizabeth Kennedy LeJeune, who performed these services.

## 2024-05-28 ENCOUNTER — TELEPHONE (OUTPATIENT)
Dept: HEMATOLOGY/ONCOLOGY | Facility: CLINIC | Age: 54
End: 2024-05-28
Payer: COMMERCIAL

## 2024-05-28 ENCOUNTER — OFFICE VISIT (OUTPATIENT)
Dept: HEMATOLOGY/ONCOLOGY | Facility: CLINIC | Age: 54
End: 2024-05-28
Payer: COMMERCIAL

## 2024-05-28 VITALS
TEMPERATURE: 98 F | BODY MASS INDEX: 21.43 KG/M2 | SYSTOLIC BLOOD PRESSURE: 124 MMHG | HEIGHT: 67 IN | RESPIRATION RATE: 18 BRPM | WEIGHT: 136.5 LBS | OXYGEN SATURATION: 100 % | DIASTOLIC BLOOD PRESSURE: 80 MMHG | HEART RATE: 68 BPM

## 2024-05-28 DIAGNOSIS — R91.1 LUNG NODULE: ICD-10-CM

## 2024-05-28 DIAGNOSIS — R93.89 ABNORMAL CT SCAN, CHEST: ICD-10-CM

## 2024-05-28 DIAGNOSIS — Z86.2 HISTORY OF HYPERCOAGULABLE STATE: ICD-10-CM

## 2024-05-28 DIAGNOSIS — D68.51 HETEROZYGOUS FACTOR V LEIDEN MUTATION: Primary | ICD-10-CM

## 2024-05-28 PROCEDURE — 1160F RVW MEDS BY RX/DR IN RCRD: CPT | Mod: CPTII,S$GLB,, | Performed by: STUDENT IN AN ORGANIZED HEALTH CARE EDUCATION/TRAINING PROGRAM

## 2024-05-28 PROCEDURE — 1159F MED LIST DOCD IN RCRD: CPT | Mod: CPTII,S$GLB,, | Performed by: STUDENT IN AN ORGANIZED HEALTH CARE EDUCATION/TRAINING PROGRAM

## 2024-05-28 PROCEDURE — 3074F SYST BP LT 130 MM HG: CPT | Mod: CPTII,S$GLB,, | Performed by: STUDENT IN AN ORGANIZED HEALTH CARE EDUCATION/TRAINING PROGRAM

## 2024-05-28 PROCEDURE — 3008F BODY MASS INDEX DOCD: CPT | Mod: CPTII,S$GLB,, | Performed by: STUDENT IN AN ORGANIZED HEALTH CARE EDUCATION/TRAINING PROGRAM

## 2024-05-28 PROCEDURE — 99999 PR PBB SHADOW E&M-EST. PATIENT-LVL IV: CPT | Mod: PBBFAC,,, | Performed by: STUDENT IN AN ORGANIZED HEALTH CARE EDUCATION/TRAINING PROGRAM

## 2024-05-28 PROCEDURE — 99204 OFFICE O/P NEW MOD 45 MIN: CPT | Mod: S$GLB,,, | Performed by: STUDENT IN AN ORGANIZED HEALTH CARE EDUCATION/TRAINING PROGRAM

## 2024-05-28 PROCEDURE — 3079F DIAST BP 80-89 MM HG: CPT | Mod: CPTII,S$GLB,, | Performed by: STUDENT IN AN ORGANIZED HEALTH CARE EDUCATION/TRAINING PROGRAM

## 2024-05-28 NOTE — Clinical Note
Hey, She is cleared from hematology perspective to undergo procedure. Give me a call if you need anything further from me! Thanks!

## 2024-05-29 ENCOUNTER — LAB VISIT (OUTPATIENT)
Dept: LAB | Facility: HOSPITAL | Age: 54
End: 2024-05-29
Attending: STUDENT IN AN ORGANIZED HEALTH CARE EDUCATION/TRAINING PROGRAM
Payer: COMMERCIAL

## 2024-05-29 DIAGNOSIS — Z86.2 HISTORY OF HYPERCOAGULABLE STATE: ICD-10-CM

## 2024-05-29 PROBLEM — D68.51 HETEROZYGOUS FACTOR V LEIDEN MUTATION: Status: ACTIVE | Noted: 2024-05-29

## 2024-05-29 PROBLEM — R91.1 LUNG NODULE: Status: ACTIVE | Noted: 2024-05-29

## 2024-05-29 LAB
ALBUMIN SERPL-MCNC: 4 G/DL (ref 3.5–5)
ALBUMIN/GLOB SERPL: 1.5 RATIO (ref 1.1–2)
ALP SERPL-CCNC: 83 UNIT/L (ref 40–150)
ALT SERPL-CCNC: 8 UNIT/L (ref 0–55)
ANION GAP SERPL CALC-SCNC: 6 MEQ/L
AST SERPL-CCNC: 14 UNIT/L (ref 5–34)
BASOPHILS # BLD AUTO: 0.06 X10(3)/MCL
BASOPHILS NFR BLD AUTO: 1.5 %
BILIRUB SERPL-MCNC: 1.3 MG/DL
BUN SERPL-MCNC: 19 MG/DL (ref 9.8–20.1)
CALCIUM SERPL-MCNC: 9.2 MG/DL (ref 8.4–10.2)
CHLORIDE SERPL-SCNC: 108 MMOL/L (ref 98–107)
CO2 SERPL-SCNC: 27 MMOL/L (ref 22–29)
CREAT SERPL-MCNC: 0.74 MG/DL (ref 0.55–1.02)
CREAT/UREA NIT SERPL: 26
EOSINOPHIL # BLD AUTO: 0.1 X10(3)/MCL (ref 0–0.9)
EOSINOPHIL NFR BLD AUTO: 2.5 %
ERYTHROCYTE [DISTWIDTH] IN BLOOD BY AUTOMATED COUNT: 12.4 % (ref 11.5–17)
GFR SERPLBLD CREATININE-BSD FMLA CKD-EPI: >60 ML/MIN/1.73/M2
GLOBULIN SER-MCNC: 2.6 GM/DL (ref 2.4–3.5)
GLUCOSE SERPL-MCNC: 91 MG/DL (ref 74–100)
HCT VFR BLD AUTO: 37.6 % (ref 37–47)
HGB BLD-MCNC: 12.4 G/DL (ref 12–16)
IMM GRANULOCYTES # BLD AUTO: 0.01 X10(3)/MCL (ref 0–0.04)
IMM GRANULOCYTES NFR BLD AUTO: 0.3 %
LYMPHOCYTES # BLD AUTO: 1.66 X10(3)/MCL (ref 0.6–4.6)
LYMPHOCYTES NFR BLD AUTO: 41.6 %
MCH RBC QN AUTO: 28.9 PG (ref 27–31)
MCHC RBC AUTO-ENTMCNC: 33 G/DL (ref 33–36)
MCV RBC AUTO: 87.6 FL (ref 80–94)
MONOCYTES # BLD AUTO: 0.35 X10(3)/MCL (ref 0.1–1.3)
MONOCYTES NFR BLD AUTO: 8.8 %
NEUTROPHILS # BLD AUTO: 1.81 X10(3)/MCL (ref 2.1–9.2)
NEUTROPHILS NFR BLD AUTO: 45.3 %
PLATELET # BLD AUTO: 222 X10(3)/MCL (ref 130–400)
PMV BLD AUTO: 10.2 FL (ref 7.4–10.4)
POTASSIUM SERPL-SCNC: 3.8 MMOL/L (ref 3.5–5.1)
PROT SERPL-MCNC: 6.6 GM/DL (ref 6.4–8.3)
RBC # BLD AUTO: 4.29 X10(6)/MCL (ref 4.2–5.4)
SODIUM SERPL-SCNC: 141 MMOL/L (ref 136–145)
WBC # SPEC AUTO: 3.99 X10(3)/MCL (ref 4.5–11.5)

## 2024-05-29 PROCEDURE — 80053 COMPREHEN METABOLIC PANEL: CPT

## 2024-05-29 PROCEDURE — 85025 COMPLETE CBC W/AUTO DIFF WBC: CPT

## 2024-05-29 PROCEDURE — 36415 COLL VENOUS BLD VENIPUNCTURE: CPT

## 2024-05-30 ENCOUNTER — TELEPHONE (OUTPATIENT)
Dept: HEMATOLOGY/ONCOLOGY | Facility: CLINIC | Age: 54
End: 2024-05-30
Payer: COMMERCIAL

## 2024-06-05 ENCOUNTER — PROCEDURE VISIT (OUTPATIENT)
Dept: RESPIRATORY THERAPY | Facility: HOSPITAL | Age: 54
End: 2024-06-05
Attending: THORACIC SURGERY (CARDIOTHORACIC VASCULAR SURGERY)
Payer: COMMERCIAL

## 2024-06-05 DIAGNOSIS — Z86.2 HISTORY OF HYPERCOAGULABLE STATE: ICD-10-CM

## 2024-06-05 DIAGNOSIS — R93.89 ABNORMAL CT SCAN, CHEST: ICD-10-CM

## 2024-06-05 DIAGNOSIS — R91.1 LUNG NODULE: ICD-10-CM

## 2024-06-05 LAB
DLCO SINGLE BREATH LLN: 19.75
DLCO SINGLE BREATH PRE REF: 60.9 %
DLCO SINGLE BREATH REF: 25.48
DLCOC SBVA LLN: 3.33
DLCOC SBVA REF: 4.68
DLCOC SINGLE BREATH LLN: 19.75
DLCOC SINGLE BREATH REF: 25.48
DLCOCSBVAULN: 6.04
DLCOCSINGLEBREATHULN: 31.22
DLCOSINGLEBREATHULN: 31.22
DLCOVA LLN: 3.33
DLCOVA PRE REF: 80.4 %
DLCOVA PRE: 3.76 ML/(MIN*MMHG*L) (ref 3.33–6.04)
DLCOVA REF: 4.68
DLCOVAULN: 6.04
ERV LLN: -16449.08
ERV PRE REF: 145.4 %
ERV REF: 0.92
ERVULN: ABNORMAL
FEF 25 75 LLN: 1.45
FEF 25 75 PRE REF: 74.4 %
FEF 25 75 REF: 2.68
FEV1 FVC LLN: 68
FEV1 FVC PRE REF: 95.1 %
FEV1 FVC REF: 80
FEV1 LLN: 2.24
FEV1 PRE REF: 88.7 %
FEV1 REF: 2.91
FRCPLETH LLN: 2.04
FRCPLETH PREREF: 127.4 %
FRCPLETH REF: 2.87
FRCPLETHULN: 3.69
FVC LLN: 2.85
FVC PRE REF: 92.6 %
FVC REF: 3.69
IVC PRE: 3.21 L (ref 2.85–4.57)
IVC SINGLE BREATH LLN: 2.85
IVC SINGLE BREATH PRE REF: 86.9 %
IVC SINGLE BREATH REF: 3.69
IVCSINGLEBREATHULN: 4.57
LLN IC: -16447.57
MVV LLN: 94
MVV PRE REF: 91.5 %
MVV REF: 111
PEF LLN: 5.18
PEF PRE REF: 95.1 %
PEF REF: 7.06
PRE DLCO: 15.51 ML/(MIN*MMHG) (ref 19.75–31.22)
PRE ERV: 1.34 L (ref -16449.08–16450.92)
PRE FEF 25 75: 2 L/S (ref 1.45–4.29)
PRE FET 100: 9.89 SEC
PRE FEV1 FVC: 75.66 % (ref 68.14–89.24)
PRE FEV1: 2.58 L (ref 2.24–3.56)
PRE FRC PL: 3.65 L (ref 2.04–3.69)
PRE FVC: 3.42 L (ref 2.85–4.57)
PRE IC: 2.08 L (ref -16447.57–16452.43)
PRE MVV: 101.45 L/MIN (ref 94.25–127.52)
PRE PEF: 6.71 L/S (ref 5.18–8.93)
PRE REF IC: 85.3 %
PRE RV: 2.31 L (ref 1.37–2.52)
PRE TLC: 5.73 L (ref 4.45–6.43)
RAW PRE REF: 67.4 %
RAW PRE: 2.06 CMH2O*S/L (ref 3.06–3.06)
RAW REF: 3.06
REF IC: 2.43
RV LLN: 1.37
RV PRE REF: 118.8 %
RV REF: 1.94
RVTLC LLN: 28
RVTLC PRE REF: 108.1 %
RVTLC PRE: 40.35 % (ref 27.73–46.91)
RVTLC REF: 37
RVTLCULN: 47
RVULN: 2.52
SGAW PRE REF: 113 %
SGAW PRE: 0.12 1/(CMH2O*S) (ref 0.1–0.1)
SGAW REF: 0.1
TLC LLN: 4.45
TLC PRE REF: 105.2 %
TLC REF: 5.44
TLC ULN: 6.43
ULN IC: ABNORMAL
VA PRE: 4.12 L (ref 5.29–5.29)
VA SINGLE BREATH PRE REF: 77.9 %
VA SINGLE BREATH REF: 5.29
VC LLN: 2.85
VC PRE REF: 92.6 %
VC PRE: 3.42 L (ref 2.85–4.57)
VC REF: 3.69
VC ULN: 4.57

## 2024-06-05 PROCEDURE — 94010 BREATHING CAPACITY TEST: CPT

## 2024-06-05 PROCEDURE — 94729 DIFFUSING CAPACITY: CPT

## 2024-06-05 PROCEDURE — 94727 GAS DIL/WSHOT DETER LNG VOL: CPT

## 2024-06-14 DIAGNOSIS — R91.1 LUNG NODULE: Primary | ICD-10-CM

## 2024-06-17 ENCOUNTER — TELEPHONE (OUTPATIENT)
Dept: CARDIAC SURGERY | Facility: CLINIC | Age: 54
End: 2024-06-17
Payer: COMMERCIAL

## 2024-06-17 NOTE — TELEPHONE ENCOUNTER
Answered all pt questions.   ----- Message from Issac Diaz sent at 6/17/2024  9:06 AM CDT -----  Regarding: questions  Contact: pt  Pt would like a call back #[phone]  Re:questions about her sx

## 2024-06-24 ENCOUNTER — HOSPITAL ENCOUNTER (OUTPATIENT)
Dept: RADIOLOGY | Facility: HOSPITAL | Age: 54
Discharge: HOME | End: 2024-06-24
Attending: THORACIC SURGERY (CARDIOTHORACIC VASCULAR SURGERY)
Payer: COMMERCIAL

## 2024-06-24 DIAGNOSIS — R91.1 LUNG NODULE: ICD-10-CM

## 2024-06-24 PROCEDURE — 71046 X-RAY EXAM CHEST 2 VIEWS: CPT | Mod: TC

## 2024-06-26 ENCOUNTER — ANESTHESIA EVENT (OUTPATIENT)
Dept: SURGERY | Facility: HOSPITAL | Age: 54
End: 2024-06-26
Payer: COMMERCIAL

## 2024-06-28 NOTE — PRE-PROCEDURE INSTRUCTIONS
"[facility]: Outpatient Surgery  Preprocedure Instructions    Expectations: "Because of inconsistent procedure completion times, an unexpected wait may occur. The physicians would like you to be here to prepare in the event they run ahead of time. We will make you as comfortable as possible and keep you informed. We apologize in advance if this happens."     Your arrival time for your surgery or procedure is __per md office____.  We ask patients to arrive about 2 hours before surgery to allow for enough time to review your health history & medications, start your IV, complete any outstanding labwork or tests, and meet your Anesthesiologist.    We are located at [facility], [address] [place] [address].    Enter through the [facility] entrance next to the Emergency Room, and come to the 6th floor to the Outpatient Surgery Department.    If you are in need of a wheelchair the morning of surgery please call [phone] about 15 minutes before you arrive. Parking is available in our parking garage located off [facility], between the hospital and [facility] [place].      Visitory Policy:  You are allowed 2 adult visitors to be with you in the hospital. Please, no switching visitors in pre-op area. All hospital visitors should be in good current health.  No small children.  We will update you and your family hourly on the progression of surgery and any unexpected delays.      What to Bring:  Please have your ID, insurance cards, and all home medication bottles with you at check in.  Bring your CPAP machine if one is used at home.     Fasting:  Nothing to eat or drink after midnight the night before your procedure. This includes no ice, gum, hard candies, and/or tobacco products.    Medications:  Follow your doctor's instructions for taking any medications on the morning of your procedure.  If no instructions for taking medications were given, do not take any medications but bring " your medications in their bottles to your procedure check in.     Follow your doctor's preoperative instructions regarding skin prep, bowel prep, bathing, or showering prior to your procedure.  If any special soaps were provided to you, please use according to your doctor's instructions. If no instructions were given from your doctor, take a good bath or shower with antibacterial soap the night before and the morning of your procedure.  On the morning of procedure, wear loose, comfortable clothing.  No lotions, makeup, perfumes, colognes, deodorant, or jewelry to your procedure.  Removable items (glasses, contact lenses, dentures, retainers, hearing aids) need to be removed for your procedure.  Bring your storage containers for these items if you wear them.     Artificial nails, body jewelry, eyelash extensions, and/or hair extensions with metal clips are not allowed during your surgery.  If you currently wear any of these items, please arrange for them to be removed prior to your arrival to the hospital.     Outpatient or Same Day Surgeries:  Any patients receiving sedation/anesthesia are advised not to drive for 24 hours after their procedure.  We do not allow patients to drive themselves home after discharge.  If you are going home after your procedure, please have someone available to drive you home from the hospital.     You may call the Outpatient Surgery Department at [phone] with any questions or concerns.  We are looking forward to meeting you and taking great care of you for your procedure.  Thank you for choosing [facility] for your surgical needs.

## 2024-07-01 ENCOUNTER — ANESTHESIA (OUTPATIENT)
Dept: SURGERY | Facility: HOSPITAL | Age: 54
End: 2024-07-01
Payer: COMMERCIAL

## 2024-07-01 ENCOUNTER — HOSPITAL ENCOUNTER (INPATIENT)
Facility: HOSPITAL | Age: 54
LOS: 3 days | Discharge: HOME | DRG: 164 | End: 2024-07-04
Attending: THORACIC SURGERY (CARDIOTHORACIC VASCULAR SURGERY) | Admitting: THORACIC SURGERY (CARDIOTHORACIC VASCULAR SURGERY)
Payer: COMMERCIAL

## 2024-07-01 DIAGNOSIS — R91.1 LUNG NODULE: ICD-10-CM

## 2024-07-01 LAB
BASOPHILS # BLD AUTO: 0.06 X10(3)/MCL
BASOPHILS NFR BLD AUTO: 0.8 %
EOSINOPHIL # BLD AUTO: 0.09 X10(3)/MCL (ref 0–0.9)
EOSINOPHIL NFR BLD AUTO: 1.2 %
ERYTHROCYTE [DISTWIDTH] IN BLOOD BY AUTOMATED COUNT: 12.6 % (ref 11.5–17)
HCT VFR BLD AUTO: 35 % (ref 37–47)
HGB BLD-MCNC: 11.5 G/DL (ref 12–16)
IMM GRANULOCYTES # BLD AUTO: 0.05 X10(3)/MCL (ref 0–0.04)
IMM GRANULOCYTES NFR BLD AUTO: 0.7 %
LYMPHOCYTES # BLD AUTO: 1.26 X10(3)/MCL (ref 0.6–4.6)
LYMPHOCYTES NFR BLD AUTO: 17 %
MCH RBC QN AUTO: 29.4 PG (ref 27–31)
MCHC RBC AUTO-ENTMCNC: 32.9 G/DL (ref 33–36)
MCV RBC AUTO: 89.5 FL (ref 80–94)
MONOCYTES # BLD AUTO: 0.32 X10(3)/MCL (ref 0.1–1.3)
MONOCYTES NFR BLD AUTO: 4.3 %
NEUTROPHILS # BLD AUTO: 5.64 X10(3)/MCL (ref 2.1–9.2)
NEUTROPHILS NFR BLD AUTO: 76 %
NRBC BLD AUTO-RTO: 0 %
PLATELET # BLD AUTO: 197 X10(3)/MCL (ref 130–400)
PMV BLD AUTO: 9.7 FL (ref 7.4–10.4)
RBC # BLD AUTO: 3.91 X10(6)/MCL (ref 4.2–5.4)
WBC # BLD AUTO: 7.42 X10(3)/MCL (ref 4.5–11.5)

## 2024-07-01 PROCEDURE — 32669 THORACOSCOPY REMOVE SEGMENT: CPT | Mod: ,,, | Performed by: THORACIC SURGERY (CARDIOTHORACIC VASCULAR SURGERY)

## 2024-07-01 PROCEDURE — 85025 COMPLETE CBC W/AUTO DIFF WBC: CPT | Performed by: PHYSICIAN ASSISTANT

## 2024-07-01 PROCEDURE — 63600175 PHARM REV CODE 636 W HCPCS: Performed by: STUDENT IN AN ORGANIZED HEALTH CARE EDUCATION/TRAINING PROGRAM

## 2024-07-01 PROCEDURE — 27201423 OPTIME MED/SURG SUP & DEVICES STERILE SUPPLY: Performed by: THORACIC SURGERY (CARDIOTHORACIC VASCULAR SURGERY)

## 2024-07-01 PROCEDURE — C1729 CATH, DRAINAGE: HCPCS | Performed by: THORACIC SURGERY (CARDIOTHORACIC VASCULAR SURGERY)

## 2024-07-01 PROCEDURE — 25000003 PHARM REV CODE 250: Performed by: PHYSICIAN ASSISTANT

## 2024-07-01 PROCEDURE — 25000003 PHARM REV CODE 250: Performed by: THORACIC SURGERY (CARDIOTHORACIC VASCULAR SURGERY)

## 2024-07-01 PROCEDURE — 71000033 HC RECOVERY, INTIAL HOUR: Performed by: THORACIC SURGERY (CARDIOTHORACIC VASCULAR SURGERY)

## 2024-07-01 PROCEDURE — 25000003 PHARM REV CODE 250: Performed by: NURSE ANESTHETIST, CERTIFIED REGISTERED

## 2024-07-01 PROCEDURE — 21400001 HC TELEMETRY ROOM

## 2024-07-01 PROCEDURE — 37000008 HC ANESTHESIA 1ST 15 MINUTES: Performed by: THORACIC SURGERY (CARDIOTHORACIC VASCULAR SURGERY)

## 2024-07-01 PROCEDURE — 63600175 PHARM REV CODE 636 W HCPCS: Performed by: ANESTHESIOLOGY

## 2024-07-01 PROCEDURE — 63600175 PHARM REV CODE 636 W HCPCS: Performed by: PHYSICIAN ASSISTANT

## 2024-07-01 PROCEDURE — 36000711: Performed by: THORACIC SURGERY (CARDIOTHORACIC VASCULAR SURGERY)

## 2024-07-01 PROCEDURE — 63600175 PHARM REV CODE 636 W HCPCS: Performed by: THORACIC SURGERY (CARDIOTHORACIC VASCULAR SURGERY)

## 2024-07-01 PROCEDURE — 0BBF4ZZ EXCISION OF RIGHT LOWER LUNG LOBE, PERCUTANEOUS ENDOSCOPIC APPROACH: ICD-10-PCS | Performed by: THORACIC SURGERY (CARDIOTHORACIC VASCULAR SURGERY)

## 2024-07-01 PROCEDURE — 32669 THORACOSCOPY REMOVE SEGMENT: CPT | Mod: AS,,, | Performed by: PHYSICIAN ASSISTANT

## 2024-07-01 PROCEDURE — 11000001 HC ACUTE MED/SURG PRIVATE ROOM

## 2024-07-01 PROCEDURE — 37000009 HC ANESTHESIA EA ADD 15 MINS: Performed by: THORACIC SURGERY (CARDIOTHORACIC VASCULAR SURGERY)

## 2024-07-01 PROCEDURE — 36000710: Performed by: THORACIC SURGERY (CARDIOTHORACIC VASCULAR SURGERY)

## 2024-07-01 PROCEDURE — 63600175 PHARM REV CODE 636 W HCPCS: Performed by: NURSE ANESTHETIST, CERTIFIED REGISTERED

## 2024-07-01 RX ORDER — LIDOCAINE HYDROCHLORIDE 10 MG/ML
INJECTION INFILTRATION; PERINEURAL
Status: DISPENSED
Start: 2024-07-01 | End: 2024-07-01

## 2024-07-01 RX ORDER — ACETAMINOPHEN 10 MG/ML
INJECTION, SOLUTION INTRAVENOUS
Status: DISCONTINUED | OUTPATIENT
Start: 2024-07-01 | End: 2024-07-01

## 2024-07-01 RX ORDER — ROCURONIUM BROMIDE 10 MG/ML
INJECTION, SOLUTION INTRAVENOUS
Status: DISCONTINUED | OUTPATIENT
Start: 2024-07-01 | End: 2024-07-01

## 2024-07-01 RX ORDER — FENTANYL CITRATE 50 UG/ML
INJECTION, SOLUTION INTRAMUSCULAR; INTRAVENOUS
Status: DISCONTINUED | OUTPATIENT
Start: 2024-07-01 | End: 2024-07-01

## 2024-07-01 RX ORDER — HYDROMORPHONE HYDROCHLORIDE 2 MG/ML
INJECTION, SOLUTION INTRAMUSCULAR; INTRAVENOUS; SUBCUTANEOUS
Status: DISCONTINUED | OUTPATIENT
Start: 2024-07-01 | End: 2024-07-01

## 2024-07-01 RX ORDER — ONDANSETRON HYDROCHLORIDE 2 MG/ML
INJECTION, SOLUTION INTRAVENOUS
Status: DISCONTINUED | OUTPATIENT
Start: 2024-07-01 | End: 2024-07-01

## 2024-07-01 RX ORDER — DEXAMETHASONE SODIUM PHOSPHATE 4 MG/ML
INJECTION, SOLUTION INTRA-ARTICULAR; INTRALESIONAL; INTRAMUSCULAR; INTRAVENOUS; SOFT TISSUE
Status: DISCONTINUED | OUTPATIENT
Start: 2024-07-01 | End: 2024-07-01

## 2024-07-01 RX ORDER — CEFAZOLIN SODIUM 2 G/50ML
2 SOLUTION INTRAVENOUS
Status: DISPENSED | OUTPATIENT
Start: 2024-07-01 | End: 2024-07-02

## 2024-07-01 RX ORDER — ONDANSETRON 4 MG/1
8 TABLET, ORALLY DISINTEGRATING ORAL EVERY 8 HOURS PRN
Status: DISCONTINUED | OUTPATIENT
Start: 2024-07-01 | End: 2024-07-04 | Stop reason: HOSPADM

## 2024-07-01 RX ORDER — MIDAZOLAM HYDROCHLORIDE 2 MG/2ML
INJECTION, SOLUTION INTRAMUSCULAR; INTRAVENOUS
Status: DISPENSED
Start: 2024-07-01 | End: 2024-07-01

## 2024-07-01 RX ORDER — SODIUM CHLORIDE 450 MG/100ML
INJECTION, SOLUTION INTRAVENOUS CONTINUOUS
Status: ACTIVE | OUTPATIENT
Start: 2024-07-01 | End: 2024-07-02

## 2024-07-01 RX ORDER — FAMOTIDINE 20 MG/1
20 TABLET, FILM COATED ORAL 2 TIMES DAILY
Status: DISCONTINUED | OUTPATIENT
Start: 2024-07-01 | End: 2024-07-04 | Stop reason: HOSPADM

## 2024-07-01 RX ORDER — HYDROMORPHONE HYDROCHLORIDE 2 MG/ML
0.4 INJECTION, SOLUTION INTRAMUSCULAR; INTRAVENOUS; SUBCUTANEOUS EVERY 5 MIN PRN
Status: DISCONTINUED | OUTPATIENT
Start: 2024-07-01 | End: 2024-07-01 | Stop reason: HOSPADM

## 2024-07-01 RX ORDER — PROPOFOL 10 MG/ML
VIAL (ML) INTRAVENOUS
Status: DISCONTINUED | OUTPATIENT
Start: 2024-07-01 | End: 2024-07-01

## 2024-07-01 RX ORDER — MEPERIDINE HYDROCHLORIDE 25 MG/ML
12.5 INJECTION INTRAMUSCULAR; INTRAVENOUS; SUBCUTANEOUS ONCE
Status: COMPLETED | OUTPATIENT
Start: 2024-07-01 | End: 2024-07-01

## 2024-07-01 RX ORDER — ENOXAPARIN SODIUM 100 MG/ML
40 INJECTION SUBCUTANEOUS EVERY 24 HOURS
Status: DISCONTINUED | OUTPATIENT
Start: 2024-07-01 | End: 2024-07-04 | Stop reason: HOSPADM

## 2024-07-01 RX ORDER — KETOROLAC TROMETHAMINE 30 MG/ML
INJECTION, SOLUTION INTRAMUSCULAR; INTRAVENOUS
Status: DISCONTINUED | OUTPATIENT
Start: 2024-07-01 | End: 2024-07-01

## 2024-07-01 RX ORDER — MUPIROCIN 20 MG/G
1 OINTMENT TOPICAL 2 TIMES DAILY
Status: DISPENSED | OUTPATIENT
Start: 2024-07-01 | End: 2024-07-03

## 2024-07-01 RX ORDER — KETOROLAC TROMETHAMINE 30 MG/ML
15 INJECTION, SOLUTION INTRAMUSCULAR; INTRAVENOUS 4 TIMES DAILY
Status: DISPENSED | OUTPATIENT
Start: 2024-07-01 | End: 2024-07-03

## 2024-07-01 RX ORDER — SODIUM CHLORIDE 0.9 % (FLUSH) 0.9 %
10 SYRINGE (ML) INJECTION
Status: DISCONTINUED | OUTPATIENT
Start: 2024-07-01 | End: 2024-07-01 | Stop reason: HOSPADM

## 2024-07-01 RX ORDER — VANCOMYCIN HCL IN 5 % DEXTROSE 1G/250ML
1000 PLASTIC BAG, INJECTION (ML) INTRAVENOUS
Status: DISPENSED | OUTPATIENT
Start: 2024-07-01 | End: 2024-07-03

## 2024-07-01 RX ORDER — LIDOCAINE HYDROCHLORIDE 20 MG/ML
INJECTION, SOLUTION EPIDURAL; INFILTRATION; INTRACAUDAL; PERINEURAL
Status: DISCONTINUED | OUTPATIENT
Start: 2024-07-01 | End: 2024-07-01

## 2024-07-01 RX ORDER — METOCLOPRAMIDE HYDROCHLORIDE 5 MG/ML
5 INJECTION INTRAMUSCULAR; INTRAVENOUS EVERY 6 HOURS PRN
Status: DISCONTINUED | OUTPATIENT
Start: 2024-07-01 | End: 2024-07-04 | Stop reason: HOSPADM

## 2024-07-01 RX ORDER — PHENYLEPHRINE HYDROCHLORIDE 10 MG/ML
INJECTION INTRAVENOUS
Status: DISCONTINUED | OUTPATIENT
Start: 2024-07-01 | End: 2024-07-01

## 2024-07-01 RX ORDER — ACETAMINOPHEN 325 MG/1
650 TABLET ORAL EVERY 4 HOURS PRN
Status: DISCONTINUED | OUTPATIENT
Start: 2024-07-01 | End: 2024-07-04 | Stop reason: HOSPADM

## 2024-07-01 RX ORDER — MIDAZOLAM HYDROCHLORIDE 1 MG/ML
INJECTION INTRAMUSCULAR; INTRAVENOUS
Status: DISCONTINUED | OUTPATIENT
Start: 2024-07-01 | End: 2024-07-01

## 2024-07-01 RX ORDER — TALC
6 POWDER (GRAM) TOPICAL NIGHTLY PRN
Status: DISCONTINUED | OUTPATIENT
Start: 2024-07-01 | End: 2024-07-04 | Stop reason: HOSPADM

## 2024-07-01 RX ORDER — DOCUSATE SODIUM 100 MG/1
100 CAPSULE, LIQUID FILLED ORAL 2 TIMES DAILY
Status: DISCONTINUED | OUTPATIENT
Start: 2024-07-01 | End: 2024-07-04 | Stop reason: HOSPADM

## 2024-07-01 RX ORDER — LOPERAMIDE HYDROCHLORIDE 2 MG/1
4 CAPSULE ORAL ONCE
Status: DISCONTINUED | OUTPATIENT
Start: 2024-07-01 | End: 2024-07-04 | Stop reason: HOSPADM

## 2024-07-01 RX ORDER — OXYCODONE HYDROCHLORIDE 5 MG/1
5 TABLET ORAL EVERY 4 HOURS PRN
Status: DISCONTINUED | OUTPATIENT
Start: 2024-07-01 | End: 2024-07-04 | Stop reason: HOSPADM

## 2024-07-01 RX ADMIN — CEFAZOLIN SODIUM 2 G: 2 SOLUTION INTRAVENOUS at 06:07

## 2024-07-01 RX ADMIN — PHENYLEPHRINE HYDROCHLORIDE 100 MCG: 10 INJECTION INTRAVENOUS at 10:07

## 2024-07-01 RX ADMIN — ACETAMINOPHEN 1000 MG: 10 INJECTION, SOLUTION INTRAVENOUS at 11:07

## 2024-07-01 RX ADMIN — DOCUSATE SODIUM 100 MG: 100 CAPSULE, LIQUID FILLED ORAL at 07:07

## 2024-07-01 RX ADMIN — VANCOMYCIN HYDROCHLORIDE 1000 MG: 1 INJECTION, POWDER, LYOPHILIZED, FOR SOLUTION INTRAVENOUS at 12:07

## 2024-07-01 RX ADMIN — SUGAMMADEX 200 MG: 100 INJECTION, SOLUTION INTRAVENOUS at 11:07

## 2024-07-01 RX ADMIN — PROPOFOL 150 MG: 10 INJECTION, EMULSION INTRAVENOUS at 10:07

## 2024-07-01 RX ADMIN — LIDOCAINE HYDROCHLORIDE 40 MG: 20 INJECTION, SOLUTION INTRAVENOUS at 10:07

## 2024-07-01 RX ADMIN — DEXAMETHASONE SODIUM PHOSPHATE 8 MG: 4 INJECTION, SOLUTION INTRA-ARTICULAR; INTRALESIONAL; INTRAMUSCULAR; INTRAVENOUS; SOFT TISSUE at 10:07

## 2024-07-01 RX ADMIN — HYDROMORPHONE HYDROCHLORIDE 0.4 MG: 2 INJECTION, SOLUTION INTRAMUSCULAR; INTRAVENOUS; SUBCUTANEOUS at 11:07

## 2024-07-01 RX ADMIN — ACETAMINOPHEN 650 MG: 325 TABLET, FILM COATED ORAL at 09:07

## 2024-07-01 RX ADMIN — HYDROMORPHONE HYDROCHLORIDE 0.4 MG: 2 INJECTION, SOLUTION INTRAMUSCULAR; INTRAVENOUS; SUBCUTANEOUS at 01:07

## 2024-07-01 RX ADMIN — KETOROLAC TROMETHAMINE 30 MG: 30 INJECTION, SOLUTION INTRAMUSCULAR; INTRAVENOUS at 11:07

## 2024-07-01 RX ADMIN — FENTANYL CITRATE 100 MCG: 50 INJECTION, SOLUTION INTRAMUSCULAR; INTRAVENOUS at 10:07

## 2024-07-01 RX ADMIN — ONDANSETRON 4 MG: 2 INJECTION INTRAMUSCULAR; INTRAVENOUS at 11:07

## 2024-07-01 RX ADMIN — HYDROMORPHONE HYDROCHLORIDE 0.4 MG: 2 INJECTION, SOLUTION INTRAMUSCULAR; INTRAVENOUS; SUBCUTANEOUS at 12:07

## 2024-07-01 RX ADMIN — SODIUM CHLORIDE: 4.5 INJECTION, SOLUTION INTRAVENOUS at 04:07

## 2024-07-01 RX ADMIN — KETOROLAC TROMETHAMINE 15 MG: 30 INJECTION, SOLUTION INTRAMUSCULAR at 06:07

## 2024-07-01 RX ADMIN — MEPERIDINE HYDROCHLORIDE 12.5 MG: 25 INJECTION INTRAMUSCULAR; INTRAVENOUS; SUBCUTANEOUS at 12:07

## 2024-07-01 RX ADMIN — MIDAZOLAM HYDROCHLORIDE 2 MG: 1 INJECTION, SOLUTION INTRAMUSCULAR; INTRAVENOUS at 08:07

## 2024-07-01 RX ADMIN — OXYCODONE HYDROCHLORIDE 5 MG: 5 TABLET ORAL at 10:07

## 2024-07-01 RX ADMIN — KETOROLAC TROMETHAMINE 15 MG: 30 INJECTION, SOLUTION INTRAMUSCULAR at 11:07

## 2024-07-01 RX ADMIN — FAMOTIDINE 20 MG: 20 TABLET, FILM COATED ORAL at 07:07

## 2024-07-01 RX ADMIN — SODIUM CHLORIDE, SODIUM GLUCONATE, SODIUM ACETATE, POTASSIUM CHLORIDE AND MAGNESIUM CHLORIDE: 526; 502; 368; 37; 30 INJECTION, SOLUTION INTRAVENOUS at 09:07

## 2024-07-01 RX ADMIN — ENOXAPARIN SODIUM 40 MG: 40 INJECTION SUBCUTANEOUS at 04:07

## 2024-07-01 RX ADMIN — MIDAZOLAM HYDROCHLORIDE 2 MG: 1 INJECTION, SOLUTION INTRAMUSCULAR; INTRAVENOUS at 09:07

## 2024-07-01 RX ADMIN — ROCURONIUM BROMIDE 50 MG: 10 SOLUTION INTRAVENOUS at 10:07

## 2024-07-01 RX ADMIN — MUPIROCIN 1 G: 20 OINTMENT TOPICAL at 07:07

## 2024-07-01 RX ADMIN — VANCOMYCIN HYDROCHLORIDE 1000 MG: 1 INJECTION, POWDER, LYOPHILIZED, FOR SOLUTION INTRAVENOUS at 11:07

## 2024-07-01 RX ADMIN — DEXTROSE MONOHYDRATE 1.5 G: 5 INJECTION INTRAVENOUS at 10:07

## 2024-07-01 NOTE — TRANSFER OF CARE
"Anesthesia Transfer of Care Note    Patient: Trang Shelton    Procedure(s) Performed: Procedure(s) (LRB):  VATS (VIDEO-ASSISTED THORACOSCOPIC SURGERY) (Right)  SEGMENTECTOMY (Right)    Patient location: PACU    Anesthesia Type: general    Transport from OR: Transported from OR on 6-10 L/min O2 by face mask with adequate spontaneous ventilation    Post pain: adequate analgesia    Post assessment: no apparent anesthetic complications and tolerated procedure well    Post vital signs: stable    Level of consciousness: sedated and responds to stimulation    Nausea/Vomiting: no nausea/vomiting    Complications: none    Transfer of care protocol was followed      Last vitals: Visit Vitals  BP (!) 129/57 (BP Location: Right arm, Patient Position: Lying)   Pulse 62   Temp 36.9 °C (98.5 °F) (Oral)   Resp 20   Ht 5' 7" (1.702 m)   Wt 59.8 kg (131 lb 13.4 oz)   SpO2 100%   Breastfeeding No   BMI 20.65 kg/m²     "

## 2024-07-01 NOTE — ANESTHESIA PREPROCEDURE EVALUATION
07/01/2024  Trang Shelton is a 53 y.o., female.      Pre-op Assessment    I have reviewed the Patient Summary Reports.     I have reviewed the Nursing Notes. I have reviewed the NPO Status.   I have reviewed the Medications.     Review of Systems  Anesthesia Hx:  No problems with previous Anesthesia               Denies Personal Hx of Anesthesia complications.                    Hematology/Oncology:                      -- Hypercoagulable state - Immunodeficiency Disorder: Factor V Leiden                  Cardiovascular:  Cardiovascular Normal                                            Pulmonary:         Right lung nodule               Renal/:  Chronic Renal Disease renal calculi               Hepatic/GI:  Hepatic/GI Normal                 Musculoskeletal:  Musculoskeletal Normal                OB/GYN/PEDS:  Post menopausal            Endocrine:  Endocrine Normal            Dermatological:  Skin Normal    Psych:  Psychiatric Normal                    Physical Exam  General: Well nourished    Airway:  Mallampati: II   Mouth Opening: Normal  TM Distance: Normal  Tongue: Normal  Neck ROM: Normal ROM    Dental:  Intact    Heart:  Rate: Normal  Rhythm: Regular Rhythm  Sounds: Normal        Anesthesia Plan  Type of Anesthesia, risks & benefits discussed:    Anesthesia Type: Gen ETT  Intra-op Monitoring Plan: Art Line  Post Op Pain Control Plan: IV/PO Opioids PRN  Induction:  IV  Airway Plan: Direct and Fiberoptic, Post-Induction  Informed Consent: Patient consented to blood products? Yes  ASA Score: 2    Ready For Surgery From Anesthesia Perspective.     .

## 2024-07-01 NOTE — H&P
History & Physical     SUBJECTIVE:      History of Present Illness:  The patient is presenting for evaluation for right lower lobe lung mass.  This has been there 11 years ago.  Recent CT was done revealing the same mass.  She was sent to me for possible intervention.  She has history of hypercoagulable disorder and she has not on any blood thinners at that time.  Apparently she only took it around her pregnancy             Chief Complaint   Patient presents with    Pre-op Exam       REFERRAL-DR. VARGAS-RT LOWER LOBE NODULE 2.2 CM (PREV 12 MM 12 YRS AGO). PMH: [employer], RESIDENT OF [facility], SPENT LOT OF TIME OUTDOORS         Review of patient's allergies indicates:  No Known Allergies     Current Medications          Current Outpatient Medications   Medication Sig Dispense Refill    omeprazole (PRILOSEC) 40 MG capsule Take 40 mg by mouth.          No current facility-administered medications for this visit.            History reviewed. No pertinent past medical history.        Past Surgical History:   Procedure Laterality Date    AUGMENTATION OF BREAST        LASIK                 Family History   Problem Relation Name Age of Onset    Lymphoma Mother        Ovarian cancer Paternal Aunt        Stomach cancer Maternal Aunt          Social History   Social History           Tobacco Use    Smoking status: Never    Smokeless tobacco: Never            Review of Systems:  Review of Systems   Constitutional: Negative.    HENT: Negative.     Eyes: Negative.    Respiratory: Negative.     Cardiovascular: Negative.    Gastrointestinal: Negative.    Endocrine: Negative.    Genitourinary: Negative.    Musculoskeletal: Negative.         Claudications   Skin: Negative.    Allergic/Immunologic: Negative.    Neurological: Negative.    Hematological: Negative.    Psychiatric/Behavioral: Negative.           OBJECTIVE:      Vital Signs (Most Recent)  Pulse: 71 (05/22/24 0814)  BP: 132/80 (05/22/24 0814)  SpO2: 100 % (05/22/24  "4014)  5' 7" (1.702 m)  61.2 kg (135 lb)      Physical Exam:  Physical Exam  Vitals reviewed.   Constitutional:       Appearance: Normal appearance.   HENT:      Head: Normocephalic and atraumatic.      Nose: Nose normal.      Mouth/Throat:      Mouth: Mucous membranes are dry.      Pharynx: Oropharynx is clear.   Eyes:      Extraocular Movements: Extraocular movements intact.      Conjunctiva/sclera: Conjunctivae normal.      Pupils: Pupils are equal, round, and reactive to light.   Cardiovascular:      Rate and Rhythm: Normal rate and regular rhythm.      Pulses: Normal pulses.   Pulmonary:      Effort: Pulmonary effort is normal.      Breath sounds: Normal breath sounds.   Abdominal:      General: Abdomen is flat.      Palpations: Abdomen is soft.   Musculoskeletal:         General: Normal range of motion.      Cervical back: Neck supple.   Skin:     General: Skin is warm and dry.   Neurological:      General: No focal deficit present.   Psychiatric:         Mood and Affect: Mood normal.            Laboratory:  None        Diagnostic Results:  CTs reviewed from this year and 11 years ago        ASSESSMENT/PLAN:      Right lower lobe lung mass.  Planning R VATS lung biopsy, possible lobectomy today.                             "

## 2024-07-01 NOTE — ANESTHESIA POSTPROCEDURE EVALUATION
Anesthesia Post Evaluation    Patient: Trang Teare    Procedure(s) Performed: Procedure(s) (LRB):  VATS (VIDEO-ASSISTED THORACOSCOPIC SURGERY) (Right)  SEGMENTECTOMY (Right)    Final Anesthesia Type: general      Patient location during evaluation: PACU  Patient participation: Yes- Able to Participate  Level of consciousness: awake and alert  Post-procedure vital signs: reviewed and stable  Pain management: adequate  Airway patency: patent    PONV status at discharge: No PONV  Anesthetic complications: no      Cardiovascular status: hemodynamically stable  Respiratory status: spontaneous ventilation and room air  Hydration status: euvolemic  Follow-up not needed.              Vitals Value Taken Time   BP 92/55 07/01/24 1321   Temp 36.3 °C (97.4 °F) 07/01/24 1303   Pulse 65 07/01/24 1325   Resp 7 07/01/24 1325   SpO2 97 % 07/01/24 1325   Vitals shown include unfiled device data.      No case tracking events are documented in the log.      Pain/Cuong Score: Pain Rating Prior to Med Admin: 7 (7/1/2024 12:48 PM)  Cuong Score: 10 (7/1/2024  1:15 PM)

## 2024-07-01 NOTE — NURSING
Nurses Note -- 4 Eyes      7/1/2024   4:45 PM      Skin assessed during: Admit      [x] No Altered Skin Integrity Present    []Prevention Measures Documented      [] Yes- Altered Skin Integrity Present or Discovered   [] LDA Added if Not in Epic (Describe Wound)   [] New Altered Skin Integrity was Present on Admit and Documented in LDA   [] Wound Image Taken    Wound Care Consulted? No    Attending Nurse:  Amrita Daigle RN/Staff Member:   Sendy

## 2024-07-01 NOTE — OP NOTE
[facility]                       [address] [id number]    PATIENT NAME:      LUISANA ORONA   YOB: 1970  CSN:               [id number]  MRN:               [id number]  ADMIT DATE:        07/01/2024 07:17:00  PHYSICIAN:         Gilberto Mcrae MD                          OPERATIVE REPORT      DATE OF SURGERY:    07/01/2024 00:00:00    SURGEON:  Gilberto Mcrae MD    PREOPERATIVE DIAGNOSIS:  Right lower lobe lung mass.    PROCEDURE:  Right VATS with right lower lobe lateral segmentectomy.    POSTOPERATIVE DIAGNOSIS:  Unable to diagnose on frozen section.    ASSISTANT:  Silvia Sheehan.    BLOOD LOSS:  Minimal.    ANESTHESIA:  General.    TECHNIQUE:  Under informed consent, the patient was taken to the OR in supine   position.  General anesthesia was induced and therefore maintained for remainder   of procedure.  Double-lumen endotracheal tube was placed.  The patient was then   turned in lateral decubitus position exposing the right chest.  Skin over right   chest was prepped and draped in the usual sterile fashion.  IV antibiotics were   administered.  Anesthesia placed the appropriate line.  8th intercostal space   anterior axillary line incision was made followed by introduction of trocar, two   working ports were placed in the subscapular space and the anterior axillary   line 4th intercostal space.  I was able to grab the lateral basal segment of the   right lower lobe and was able to see the mass.  Segmentectomy was performed   using endoscopic stapler.  The specimen was sent for pathology and multiple   frozen sections were taken, but none of them diagnostic.  There was no evidence   of tumor cells but they could not call a diagnosis.  Chest tube was placed in   position in the apex.  The wounds were closed in layers of absorbable sutures.    The patient tolerated the procedure  well.        ______________________________  MD GONZALO Bermudez/FRENCH  DD:  07/01/2024  Time:  12:23PM  DT:  07/01/2024  Time:  12:41PM  Job #:  [id number]      OPERATIVE REPORT

## 2024-07-02 LAB
ANION GAP SERPL CALC-SCNC: 6 MEQ/L
BASOPHILS # BLD AUTO: 0.02 X10(3)/MCL
BASOPHILS NFR BLD AUTO: 0.2 %
BUN SERPL-MCNC: 14.4 MG/DL (ref 9.8–20.1)
CALCIUM SERPL-MCNC: 8.6 MG/DL (ref 8.4–10.2)
CHLORIDE SERPL-SCNC: 111 MMOL/L (ref 98–107)
CO2 SERPL-SCNC: 22 MMOL/L (ref 22–29)
CREAT SERPL-MCNC: 0.78 MG/DL (ref 0.55–1.02)
CREAT/UREA NIT SERPL: 18
EOSINOPHIL # BLD AUTO: 0 X10(3)/MCL (ref 0–0.9)
EOSINOPHIL NFR BLD AUTO: 0 %
ERYTHROCYTE [DISTWIDTH] IN BLOOD BY AUTOMATED COUNT: 12.4 % (ref 11.5–17)
GFR SERPLBLD CREATININE-BSD FMLA CKD-EPI: >60 ML/MIN/1.73/M2
GLUCOSE SERPL-MCNC: 133 MG/DL (ref 74–100)
HCT VFR BLD AUTO: 34.6 % (ref 37–47)
HGB BLD-MCNC: 11.3 G/DL (ref 12–16)
IMM GRANULOCYTES # BLD AUTO: 0.06 X10(3)/MCL (ref 0–0.04)
IMM GRANULOCYTES NFR BLD AUTO: 0.5 %
LYMPHOCYTES # BLD AUTO: 1.14 X10(3)/MCL (ref 0.6–4.6)
LYMPHOCYTES NFR BLD AUTO: 10.4 %
MCH RBC QN AUTO: 28.9 PG (ref 27–31)
MCHC RBC AUTO-ENTMCNC: 32.7 G/DL (ref 33–36)
MCV RBC AUTO: 88.5 FL (ref 80–94)
MONOCYTES # BLD AUTO: 0.99 X10(3)/MCL (ref 0.1–1.3)
MONOCYTES NFR BLD AUTO: 9.1 %
NEUTROPHILS # BLD AUTO: 8.7 X10(3)/MCL (ref 2.1–9.2)
NEUTROPHILS NFR BLD AUTO: 79.8 %
NRBC BLD AUTO-RTO: 0 %
PLATELET # BLD AUTO: 198 X10(3)/MCL (ref 130–400)
PMV BLD AUTO: 10.4 FL (ref 7.4–10.4)
POTASSIUM SERPL-SCNC: 4.8 MMOL/L (ref 3.5–5.1)
RBC # BLD AUTO: 3.91 X10(6)/MCL (ref 4.2–5.4)
SODIUM SERPL-SCNC: 139 MMOL/L (ref 136–145)
WBC # BLD AUTO: 10.91 X10(3)/MCL (ref 4.5–11.5)

## 2024-07-02 PROCEDURE — 85025 COMPLETE CBC W/AUTO DIFF WBC: CPT | Performed by: PHYSICIAN ASSISTANT

## 2024-07-02 PROCEDURE — 11000001 HC ACUTE MED/SURG PRIVATE ROOM

## 2024-07-02 PROCEDURE — 21400001 HC TELEMETRY ROOM

## 2024-07-02 PROCEDURE — 80048 BASIC METABOLIC PNL TOTAL CA: CPT | Performed by: PHYSICIAN ASSISTANT

## 2024-07-02 PROCEDURE — 36415 COLL VENOUS BLD VENIPUNCTURE: CPT | Performed by: PHYSICIAN ASSISTANT

## 2024-07-02 PROCEDURE — 63600175 PHARM REV CODE 636 W HCPCS: Performed by: PHYSICIAN ASSISTANT

## 2024-07-02 PROCEDURE — 25000003 PHARM REV CODE 250: Performed by: PHYSICIAN ASSISTANT

## 2024-07-02 RX ADMIN — VANCOMYCIN HYDROCHLORIDE 1000 MG: 1 INJECTION, POWDER, LYOPHILIZED, FOR SOLUTION INTRAVENOUS at 12:07

## 2024-07-02 RX ADMIN — MUPIROCIN 1 G: 20 OINTMENT TOPICAL at 08:07

## 2024-07-02 RX ADMIN — OXYCODONE HYDROCHLORIDE 5 MG: 5 TABLET ORAL at 10:07

## 2024-07-02 RX ADMIN — KETOROLAC TROMETHAMINE 15 MG: 30 INJECTION, SOLUTION INTRAMUSCULAR at 04:07

## 2024-07-02 RX ADMIN — DOCUSATE SODIUM 100 MG: 100 CAPSULE, LIQUID FILLED ORAL at 07:07

## 2024-07-02 RX ADMIN — FAMOTIDINE 20 MG: 20 TABLET, FILM COATED ORAL at 07:07

## 2024-07-02 RX ADMIN — DOCUSATE SODIUM 100 MG: 100 CAPSULE, LIQUID FILLED ORAL at 08:07

## 2024-07-02 RX ADMIN — KETOROLAC TROMETHAMINE 15 MG: 30 INJECTION, SOLUTION INTRAMUSCULAR at 06:07

## 2024-07-02 RX ADMIN — FAMOTIDINE 20 MG: 20 TABLET, FILM COATED ORAL at 08:07

## 2024-07-02 RX ADMIN — SODIUM CHLORIDE: 4.5 INJECTION, SOLUTION INTRAVENOUS at 01:07

## 2024-07-02 RX ADMIN — ACETAMINOPHEN 650 MG: 325 TABLET, FILM COATED ORAL at 10:07

## 2024-07-02 RX ADMIN — ENOXAPARIN SODIUM 40 MG: 40 INJECTION SUBCUTANEOUS at 05:07

## 2024-07-02 RX ADMIN — KETOROLAC TROMETHAMINE 15 MG: 30 INJECTION, SOLUTION INTRAMUSCULAR at 12:07

## 2024-07-02 NOTE — PROGRESS NOTES
[facility]   Rounding Progress Note  Cardiothoracic Surgery    SUBJECTIVE:     Chief Complaint/Reason for Admission: R lower lobe mass s/p wedge resection    NAEON; AF and HDS  Had some pain issues overnight but PRNs are working  Tolerating diet without any issue  Voiding well  54 from chest tube, small air leak when coughing    No current facility-administered medications on file prior to encounter.     Current Outpatient Medications on File Prior to Encounter   Medication Sig Dispense Refill    omeprazole (PRILOSEC) 40 MG capsule Take 40 mg by mouth every morning.          Review of patient's allergies indicates:  No Known Allergies     Past Medical History:   Diagnosis Date    Digestive disorder     GERD    Factor V Leiden     Kidney stone     Lung nodule     Thrombophilia         Past Surgical History:   Procedure Laterality Date    AUGMENTATION OF BREAST      BREAST SURGERY  2018     SECTION   and     COLONOSCOPY  2023    LAS      TUBAL LIGATION           OBJECTIVE:        Physical Exam:  Physical Exam  Vitals and nursing note reviewed.   Constitutional:       General: She is not in acute distress.     Appearance: Normal appearance. She is not ill-appearing.   HENT:      Head: Normocephalic and atraumatic.      Nose: Nose normal.   Eyes:      Conjunctiva/sclera: Conjunctivae normal.   Cardiovascular:      Rate and Rhythm: Normal rate and regular rhythm.      Pulses: Normal pulses.   Pulmonary:      Effort: Pulmonary effort is normal. No respiratory distress.      Comments: R chest with dressings in place, serosang strikethrough; CT in place, serosang output, small air leak when coughing  Abdominal:      General: Abdomen is flat. There is no distension.      Palpations: Abdomen is soft.   Musculoskeletal:         General: Normal range of motion.      Cervical back: Normal range of motion.   Skin:     General: Skin is warm.   Neurological:      General: No focal deficit  present.      Mental Status: She is alert and oriented to person, place, and time.   Psychiatric:         Mood and Affect: Mood normal.          Laboratory:  I have reviewed all pertinent lab results within the past 24 hours.  Labs pending this morning    Diagnostic Results:  X-Ray: Reviewed          ASSESSMENT/PLAN:   This is a 54 y/o F s/p R VATS with RLL lateral segmentectomy    -Pt doing well this morning, no  major issues  -continue R chest tube for now, small air leak with cough this morning  -cont regular diet  -continue current pain regimen  -OOB  -fluids to stop today  -ramses Colin MD, PGY-4  Cardiothoracic Surgery

## 2024-07-02 NOTE — PLAN OF CARE
Problem: Adult Inpatient Plan of Care  Goal: Plan of Care Review  Outcome: Progressing  Goal: Patient-Specific Goal (Individualized)  Outcome: Progressing  Goal: Absence of Hospital-Acquired Illness or Injury  Outcome: Progressing  Goal: Optimal Comfort and Wellbeing  Outcome: Progressing  Goal: Readiness for Transition of Care  Outcome: Progressing     Problem: Infection  Goal: Absence of Infection Signs and Symptoms  Outcome: Progressing     Problem: Wound  Goal: Optimal Coping  Outcome: Progressing  Goal: Optimal Functional Ability  Outcome: Progressing  Goal: Absence of Infection Signs and Symptoms  Outcome: Progressing  Goal: Improved Oral Intake  Outcome: Progressing  Goal: Optimal Pain Control and Function  Outcome: Progressing  Goal: Skin Health and Integrity  Outcome: Progressing  Goal: Optimal Wound Healing  Outcome: Progressing     Problem: Fall Injury Risk  Goal: Absence of Fall and Fall-Related Injury  Outcome: Progressing       See today's assessment and documentation for interventions.

## 2024-07-03 LAB
ANION GAP SERPL CALC-SCNC: 5 MEQ/L
BUN SERPL-MCNC: 16.8 MG/DL (ref 9.8–20.1)
CALCIUM SERPL-MCNC: 9 MG/DL (ref 8.4–10.2)
CHLORIDE SERPL-SCNC: 112 MMOL/L (ref 98–107)
CO2 SERPL-SCNC: 25 MMOL/L (ref 22–29)
CREAT SERPL-MCNC: 0.76 MG/DL (ref 0.55–1.02)
CREAT/UREA NIT SERPL: 22
GFR SERPLBLD CREATININE-BSD FMLA CKD-EPI: >60 ML/MIN/1.73/M2
GLUCOSE SERPL-MCNC: 99 MG/DL (ref 74–100)
POTASSIUM SERPL-SCNC: 4.2 MMOL/L (ref 3.5–5.1)
SODIUM SERPL-SCNC: 142 MMOL/L (ref 136–145)

## 2024-07-03 PROCEDURE — 21400001 HC TELEMETRY ROOM

## 2024-07-03 PROCEDURE — 80048 BASIC METABOLIC PNL TOTAL CA: CPT | Performed by: PHYSICIAN ASSISTANT

## 2024-07-03 PROCEDURE — 63600175 PHARM REV CODE 636 W HCPCS: Performed by: PHYSICIAN ASSISTANT

## 2024-07-03 PROCEDURE — 25000003 PHARM REV CODE 250: Performed by: PHYSICIAN ASSISTANT

## 2024-07-03 PROCEDURE — 36415 COLL VENOUS BLD VENIPUNCTURE: CPT | Performed by: PHYSICIAN ASSISTANT

## 2024-07-03 RX ADMIN — FAMOTIDINE 20 MG: 20 TABLET, FILM COATED ORAL at 08:07

## 2024-07-03 RX ADMIN — FAMOTIDINE 20 MG: 20 TABLET, FILM COATED ORAL at 09:07

## 2024-07-03 RX ADMIN — OXYCODONE HYDROCHLORIDE 5 MG: 5 TABLET ORAL at 08:07

## 2024-07-03 RX ADMIN — DOCUSATE SODIUM 100 MG: 100 CAPSULE, LIQUID FILLED ORAL at 09:07

## 2024-07-03 RX ADMIN — OXYCODONE HYDROCHLORIDE 5 MG: 5 TABLET ORAL at 01:07

## 2024-07-03 RX ADMIN — ENOXAPARIN SODIUM 40 MG: 40 INJECTION SUBCUTANEOUS at 04:07

## 2024-07-03 RX ADMIN — DOCUSATE SODIUM 100 MG: 100 CAPSULE, LIQUID FILLED ORAL at 08:07

## 2024-07-03 NOTE — PROGRESS NOTES
[facility]   Rounding Progress Note  Cardiothoracic Surgery    SUBJECTIVE:     Chief Complaint/Reason for Admission: R lower lobe mass s/p wedge resection    NAEON; AF and HDS  Pain improving  Tolerating diet  40 from chest tube, no air leak    No current facility-administered medications on file prior to encounter.     Current Outpatient Medications on File Prior to Encounter   Medication Sig Dispense Refill    omeprazole (PRILOSEC) 40 MG capsule Take 40 mg by mouth every morning.          Review of patient's allergies indicates:  No Known Allergies     Past Medical History:   Diagnosis Date    Digestive disorder     GERD    Factor V Leiden     Kidney stone     Lung nodule     Thrombophilia         Past Surgical History:   Procedure Laterality Date    AUGMENTATION OF BREAST      BREAST SURGERY  2018     SECTION   and     COLONOSCOPY  2023    LASIK      SEGMENTECTOMY Right 2024    Procedure: SEGMENTECTOMY;  Surgeon: Gilberto Mcrae MD;  Location: [facility];  Service: Cardiothoracic;  Laterality: Right;  RIGHT LOWER LOBE POSTERIOR SEGMENTECTOMY    TUBAL LIGATION  2009    VIDEO-ASSISTED THORACOSCOPIC SURGERY (VATS) Right 2024    Procedure: VATS (VIDEO-ASSISTED THORACOSCOPIC SURGERY);  Surgeon: Gilberto Mcrae MD;  Location: [facility];  Service: Cardiothoracic;  Laterality: Right;         OBJECTIVE:        Physical Exam:  Physical Exam  Vitals and nursing note reviewed.   Constitutional:       General: She is not in acute distress.     Appearance: Normal appearance. She is not ill-appearing.   HENT:      Head: Normocephalic and atraumatic.      Nose: Nose normal.   Eyes:      Conjunctiva/sclera: Conjunctivae normal.   Cardiovascular:      Rate and Rhythm: Normal rate and regular rhythm.      Pulses: Normal pulses.   Pulmonary:      Effort: Pulmonary effort is normal. No respiratory distress.      Comments: R chest with dressings in place, serosang strikethrough; CT in place,  serosang output, no airleak  Abdominal:      General: Abdomen is flat. There is no distension.      Palpations: Abdomen is soft.   Musculoskeletal:         General: Normal range of motion.      Cervical back: Normal range of motion.   Skin:     General: Skin is warm.   Neurological:      General: No focal deficit present.      Mental Status: She is alert and oriented to person, place, and time.   Psychiatric:         Mood and Affect: Mood normal.          Laboratory:  I have reviewed all pertinent lab results within the past 24 hours.  Labs pending this morning    Diagnostic Results:  X-Ray: Reviewed          ASSESSMENT/PLAN:   This is a 54 y/o F s/p R VATS with RLL lateral segmentectomy    -Pt doing well this morning, no  major issues  -likely able to remove chest tube today, CXR looks good, small apical ptx  -reg diet  -OOB  -lovenox    George Colin MD, PGY-4  Cardiothoracic Surgery

## 2024-07-04 VITALS
HEART RATE: 78 BPM | DIASTOLIC BLOOD PRESSURE: 68 MMHG | SYSTOLIC BLOOD PRESSURE: 112 MMHG | RESPIRATION RATE: 14 BRPM | TEMPERATURE: 98 F | WEIGHT: 131.81 LBS | BODY MASS INDEX: 20.69 KG/M2 | OXYGEN SATURATION: 96 % | HEIGHT: 67 IN

## 2024-07-04 PROBLEM — R91.8 RIGHT LOWER LOBE LUNG MASS: Status: ACTIVE | Noted: 2024-07-04

## 2024-07-04 PROCEDURE — 25000003 PHARM REV CODE 250: Performed by: PHYSICIAN ASSISTANT

## 2024-07-04 RX ORDER — OXYCODONE HYDROCHLORIDE 5 MG/1
5 TABLET ORAL EVERY 6 HOURS PRN
Qty: 20 TABLET | Refills: 0 | Status: SHIPPED | OUTPATIENT
Start: 2024-07-04

## 2024-07-04 RX ORDER — METHOCARBAMOL 500 MG/1
500 TABLET, FILM COATED ORAL 3 TIMES DAILY PRN
Qty: 21 TABLET | Refills: 0 | Status: SHIPPED | OUTPATIENT
Start: 2024-07-04 | End: 2024-07-11

## 2024-07-04 RX ADMIN — FAMOTIDINE 20 MG: 20 TABLET, FILM COATED ORAL at 08:07

## 2024-07-04 RX ADMIN — DOCUSATE SODIUM 100 MG: 100 CAPSULE, LIQUID FILLED ORAL at 08:07

## 2024-07-04 RX ADMIN — ACETAMINOPHEN 650 MG: 325 TABLET, FILM COATED ORAL at 08:07

## 2024-07-04 NOTE — DISCHARGE SUMMARY
[facility] 6th Floor Medical Telemetry  Cardiothoracic Surgery  Discharge Summary      Patient Name: Trang Shelton  MRN: [id number]  Admission Date: 7/1/2024  Hospital Length of Stay: 3 days  Discharge Date and Time: No discharge date for patient encounter.  Attending Physician: Gilberto Mcrae MD   Discharging Provider: MICHAEL Mak  Primary Care Provider: Delma, Primary Doctor    HPI:   No notes on file    Procedure(s) (LRB):  VATS (VIDEO-ASSISTED THORACOSCOPIC SURGERY) (Right)  SEGMENTECTOMY (Right)      Indwelling Lines/Drains at time of discharge:   Lines/Drains/Airways       None                 Hospital Course: No notes on file    Goals of Care Treatment Preferences:             Significant Diagnostic Studies: Labs: All labs within the past 24 hours have been reviewed    Pending Diagnostic Studies:       Procedure Component Value Units Date/Time    Specimen to Pathology [[id number]] Collected: 07/01/24 1104    Order Status: Sent Lab Status: In process Updated: 07/01/24 1614    Specimen: Tissue from Lung, RLL     X-Ray Chest PA And Lateral [[id number]] Resulted: 07/04/24 0613    Order Status: Sent Lab Status: In process Updated: 07/04/24 0635            No new Assessment & Plan notes have been filed under this hospital service since the last note was generated.  Service: Cardiothoracic Surgery    Final Active Diagnoses:    Diagnosis Date Noted POA    PRINCIPAL PROBLEM:  Right lower lobe lung mass [R91.8] 07/04/2024 Yes      Problems Resolved During this Admission:      Discharged Condition: good    Disposition: Home or Self Care    Follow Up:   Follow-up Information       Gilberto Mcrae MD Follow up in 3 week(s).    Specialties: Cardiothoracic Surgery, Cardiology  Why: We will call with appointment date and time.  Contact information:  [address] [place] [address] [address]afne [address]  [facility] [id number] [phone]               Gilberto Mcrae MD Follow up on 7/10/2024.    Specialties: Cardiothoracic  Surgery, Cardiology  Why: For suture removal    We will call with appointment date and time.  Contact information:  [address] [place] [address] [address]  [facility] [address] [phone]                           Patient Instructions:   No discharge procedures on file.  Medications:  Reconciled Home Medications:      Medication List        START taking these medications      methocarbamoL 500 MG Tab  Commonly known as: ROBAXIN  Take 1 tablet (500 mg total) by mouth 3 (three) times daily as needed (muscle spasm).     oxyCODONE 5 MG immediate release tablet  Commonly known as: ROXICODONE  Take 1 tablet (5 mg total) by mouth every 6 (six) hours as needed for Pain.            STOP taking these medications      omeprazole 40 MG capsule  Commonly known as: PRILOSEC            POD #3  Awake. Alert.  Sitting up in bed  C/o muscle tightness on the right    AFVSS. 96% on RA  Heart: RRR  Lungs: respirations nonlabored, clear  Incisions: c/d/I  Cxr: stable    A/p: s/p R VATS for right lower lobe lateral segmentectomy    - final path pending  - d/c home  - f/u 3 weeks with Dr. Mcrae  Time spent on the discharge of patient: 20 minutes    MICHAEL Mak  Cardiothoracic Surgery  [facility] - 6th Floor Medical Telemetry

## 2024-07-11 LAB — PSYCHE PATHOLOGY RESULT: NORMAL

## 2024-07-18 ENCOUNTER — TELEPHONE (OUTPATIENT)
Dept: CARDIAC SURGERY | Facility: CLINIC | Age: 54
End: 2024-07-18
Payer: COMMERCIAL

## 2024-07-18 NOTE — TELEPHONE ENCOUNTER
Pt called to ask about pathology report and if Dr. Mcrae could review and call her to explain, she is upset and worried.  Message sent to MD/PA group to review. Her post op visit next week but she saw her results in her [facility] and has questions.   ----- Message from Tosha Cortes sent at 7/18/2024  8:34 AM CDT -----  Patient called this morning asking you to give her a call back, number verified in her chart

## 2024-07-24 ENCOUNTER — OFFICE VISIT (OUTPATIENT)
Dept: CARDIAC SURGERY | Facility: CLINIC | Age: 54
End: 2024-07-24
Payer: COMMERCIAL

## 2024-07-24 VITALS
HEIGHT: 67 IN | WEIGHT: 123 LBS | HEART RATE: 61 BPM | BODY MASS INDEX: 19.3 KG/M2 | OXYGEN SATURATION: 99 % | DIASTOLIC BLOOD PRESSURE: 67 MMHG | SYSTOLIC BLOOD PRESSURE: 120 MMHG

## 2024-07-24 DIAGNOSIS — C80.1: ICD-10-CM

## 2024-07-24 DIAGNOSIS — R91.8 RIGHT LOWER LOBE LUNG MASS: Primary | ICD-10-CM

## 2024-07-24 PROCEDURE — 99024 POSTOP FOLLOW-UP VISIT: CPT | Mod: ,,, | Performed by: THORACIC SURGERY (CARDIOTHORACIC VASCULAR SURGERY)

## 2024-07-24 PROCEDURE — 1160F RVW MEDS BY RX/DR IN RCRD: CPT | Mod: CPTII,,, | Performed by: THORACIC SURGERY (CARDIOTHORACIC VASCULAR SURGERY)

## 2024-07-24 PROCEDURE — 1159F MED LIST DOCD IN RCRD: CPT | Mod: CPTII,,, | Performed by: THORACIC SURGERY (CARDIOTHORACIC VASCULAR SURGERY)

## 2024-07-24 PROCEDURE — 3078F DIAST BP <80 MM HG: CPT | Mod: CPTII,,, | Performed by: THORACIC SURGERY (CARDIOTHORACIC VASCULAR SURGERY)

## 2024-07-24 PROCEDURE — 3074F SYST BP LT 130 MM HG: CPT | Mod: CPTII,,, | Performed by: THORACIC SURGERY (CARDIOTHORACIC VASCULAR SURGERY)

## 2024-07-24 NOTE — PROGRESS NOTES
"Trang Shelton is a 53 y.o. female patient.   No diagnosis found.  Past Medical History:   Diagnosis Date    Digestive disorder     GERD    Factor V Leiden     Kidney stone     Lung nodule     Thrombophilia      Past Surgical History Pertinent Negatives:   Procedure Date Noted    ADENOIDECTOMY 05/28/2024    APPENDECTOMY 05/28/2024    BRAIN SURGERY 05/28/2024    CARDIAC VALVE REPLACEMENT 05/28/2024    CHOLECYSTECTOMY 05/28/2024    COLON SURGERY 05/28/2024    CORONARY ARTERY BYPASS GRAFT 05/28/2024    COSMETIC SURGERY 05/28/2024    EYE SURGERY 05/28/2024    FRACTURE SURGERY 05/28/2024    HERNIA REPAIR 05/28/2024    HYSTERECTOMY 05/28/2024    JOINT REPLACEMENT 05/28/2024    KIDNEY TRANSPLANT 05/28/2024    LIVER TRANSPLANT 05/28/2024    PROSTATE SURGERY 05/28/2024    SMALL INTESTINE SURGERY 05/28/2024    SPINE SURGERY 05/28/2024    TONSILLECTOMY 05/28/2024    VASECTOMY 05/28/2024     Scheduled Meds:  Continuous Infusions:  PRN Meds:    Review of patient's allergies indicates:  No Known Allergies  There are no hospital problems to display for this patient.    Blood pressure 120/67, pulse 61, height 5' 7" (1.702 m), weight 55.8 kg (123 lb), SpO2 99%.    Subjective:  The patient is returning status post segmentectomy.  She has been doing well with no complaints.      Objective:  Her wounds have healed well.  Final pathology with a 2nd opinion from [facility] revealed colloidal adenocarcinoma.      Assessment & Plan:  I had a long discussion with the patient and her .  At this time my preference is to proceed with completion lobectomy and lymph node dissection.  I also thought we can get a PET scan in a couple of months for further evaluation.  I will refer her to Dr Cantrell.  I will schedule for PET scan.  I did discuss with her the possibility of 2 different processes in her lobe also justifying in the need for completion lobectomy  Gilberto Mcrae MD  7/24/2024    "

## 2024-07-26 ENCOUNTER — TELEPHONE (OUTPATIENT)
Dept: CARDIAC SURGERY | Facility: CLINIC | Age: 54
End: 2024-07-26
Payer: COMMERCIAL

## 2024-07-26 DIAGNOSIS — C80.1: ICD-10-CM

## 2024-07-26 DIAGNOSIS — R91.8 RIGHT LOWER LOBE LUNG MASS: ICD-10-CM

## 2024-07-26 NOTE — TELEPHONE ENCOUNTER
Pt is requesting to see Dr. Cantrell as Dr. Mcrae discussed with Dr. Cantrell not Dr. Dominguez.    ----- Message from Issac Diaz sent at 7/26/2024  9:36 AM CDT -----  Regarding: APPT  Contact: [phone]  PT NEEDS A CALL BACK [phone]  RE:NEEDS A CALL BACK ABOUT AN ONCOLOGY APPT

## 2024-07-29 ENCOUNTER — HOSPITAL ENCOUNTER (OUTPATIENT)
Dept: RADIOLOGY | Facility: HOSPITAL | Age: 54
Discharge: HOME | End: 2024-07-29
Attending: FAMILY MEDICINE
Payer: COMMERCIAL

## 2024-07-29 DIAGNOSIS — R05.9 COUGH: ICD-10-CM

## 2024-07-29 PROCEDURE — 71046 X-RAY EXAM CHEST 2 VIEWS: CPT | Mod: TC

## 2024-07-30 NOTE — PROGRESS NOTES
"Subjective:       Patient ID: Trang Shelton is a 53 y.o. female.    Chief Complaint: Follow up    Diagnosis:   Heterozygous Factor V Leiden  T1c Right Lung Colloid Adenocarcinoma    Current Treatment: None    Treatment History: N/A    HPI:   54 yo presented in May '24 for evaluation of hypercoagulable disorder prior to lung biopsy/possible resection for lung mass. In 2007 after experiencing 3 miscarriages she had a hypercoag workup with  with GYN who told her she was at increased risk of blood clots. She became pregnant through IVF 2x and both pregnancies she took Heparin/Lovenox prophylactically without issue. She has no history of any thrombosis of any kind nor any family history of DVT/PE. She has had previous surgeries including breast impantation and removal without issue or additional anticoagulation. She has never taken anticoagulation outside of pregnancy. After review of her hypercoag workup done in 2007 she is negative for APLA, Prothrombin, and Antithrombin mutations/abnormalities. She is heterozygous for FVL. She has 2 other genetic abnormalities that reportedly "could be linked to increased thrombosis risk" however this is not clinically significant. Ultimately she was given clearance to proceed with her lung mass resection procedure without any limitations or restrictions.      She underwent segmentectomy with Dr. Mcrae on 7/1/24 where final pathology revealed 3cm colloid adenocarcinoma, G1, no LVI, no lymph nodes sampled. pT1cNx. She presents to medical oncology for further evaluation. CVT recommended completion segmentectomy with LN dissection.      Interval History:  Patient presents to clinic today for newly diagnosed right lung colloid adenocarcinoma.  She states she is doing well today.   Today she is here with her sister in law.  Continues to heal well from her surgery.  Denies cough or shortness of breath.  I discussed her diagnosis, need for further workup to complete staging and " recommendations following NCCN guidelines. All questions were asked at the time of the visit.       Past Medical History:   Diagnosis Date    Digestive disorder     GERD    Factor V Leiden     Kidney stone     Lung nodule     Thrombophilia       Past Surgical History:   Procedure Laterality Date    AUGMENTATION OF BREAST      BREAST SURGERY  2018     SECTION   and     COLONOSCOPY  2023    LASIK      SEGMENTECTOMY Right 2024    Procedure: SEGMENTECTOMY;  Surgeon: Gilberto Mcrae MD;  Location: [facility];  Service: Cardiothoracic;  Laterality: Right;  RIGHT LOWER LOBE POSTERIOR SEGMENTECTOMY    TUBAL LIGATION  2009    VIDEO-ASSISTED THORACOSCOPIC SURGERY (VATS) Right 2024    Procedure: VATS (VIDEO-ASSISTED THORACOSCOPIC SURGERY);  Surgeon: Gilberto Mcrae MD;  Location: [facility];  Service: Cardiothoracic;  Laterality: Right;     Social History     Socioeconomic History    Marital status:    Tobacco Use    Smoking status: Never    Smokeless tobacco: Never   Substance and Sexual Activity    Alcohol use: Never    Drug use: Never    Sexual activity: Yes     Partners: Male     Birth control/protection: Post-menopausal     Social Determinants of Health     Financial Resource Strain: Patient Declined (7/3/2024)    Overall Financial Resource Strain (CARDIA)     Difficulty of Paying Living Expenses: Patient declined   Food Insecurity: Patient Declined (7/3/2024)    Hunger Vital Sign     Worried About Running Out of Food in the Last Year: Patient declined     Ran Out of Food in the Last Year: Patient declined   Transportation Needs: Patient Declined (7/3/2024)    TRANSPORTATION NEEDS     Transportation : Patient declined   Stress: Patient Declined (7/3/2024)    [employer] - Occupational Stress Questionnaire     Feeling of Stress : Patient declined   Housing Stability: Patient Declined (7/3/2024)    Housing Stability Vital Sign     Unable to Pay for Housing in the  Last Year: Patient declined     Homeless in the Last Year: Patient declined      Family History   Problem Relation Name Age of Onset    Lymphoma Mother Diana Amor     Cancer Mother Diana Amor     Ovarian cancer Paternal Aunt      Stomach cancer Maternal Aunt      Diabetes Father Jorge Amor       Review of patient's allergies indicates:  No Known Allergies   Review of Systems   Constitutional:  Negative for appetite change and unexpected weight change.   HENT:  Negative for mouth sores.    Eyes:  Negative for visual disturbance.   Respiratory:  Negative for cough and shortness of breath.    Cardiovascular:  Negative for chest pain.   Gastrointestinal:  Negative for abdominal pain and diarrhea.   Genitourinary:  Negative for frequency.   Musculoskeletal:  Negative for back pain.   Integumentary:  Negative for rash.   Neurological:  Negative for headaches.   Hematological:  Negative for adenopathy.   Psychiatric/Behavioral:  The patient is not nervous/anxious.          Objective:      Vitals:    08/02/24 1002   BP: 124/72   Pulse: 79   Resp: 18   Temp: 98 °F (36.7 °C)         Physical Exam  Constitutional:       General: She is not in acute distress.     Appearance: Normal appearance. She is not ill-appearing.   HENT:      Head: Normocephalic and atraumatic.      Nose: Nose normal.      Mouth/Throat:      Mouth: Mucous membranes are moist.      Pharynx: Oropharynx is clear.   Eyes:      Extraocular Movements: Extraocular movements intact.      Conjunctiva/sclera: Conjunctivae normal.      Pupils: Pupils are equal, round, and reactive to light.   Cardiovascular:      Rate and Rhythm: Normal rate and regular rhythm.      Pulses: Normal pulses.      Heart sounds: Normal heart sounds. No murmur heard.  Pulmonary:      Effort: Pulmonary effort is normal. No respiratory distress.      Breath sounds: Normal breath sounds.   Abdominal:      General: There is no distension.      Palpations: Abdomen is soft.       Tenderness: There is no abdominal tenderness.   Musculoskeletal:         General: Normal range of motion.      Cervical back: Normal range of motion and neck supple.      Right lower leg: No edema.      Left lower leg: No edema.   Lymphadenopathy:      Cervical: No cervical adenopathy.   Skin:     General: Skin is warm and dry.   Neurological:      General: No focal deficit present.      Mental Status: She is alert and oriented to person, place, and time.       LABS AND IMAGING REVIEWED IN EPIC    IMAGIN24 CT Chest:  Impression:  1. 2.2 cm lobulated spiculated nodular focus posteromedial right lower lung.  A neoplastic etiology must be considered  2. Thoracic spondylosis with 8 mm sclerotic nodule at T9  3. 4 mm low-attenuation nodule lower left thyroid lobe    24 CT Renal Stone Study A/P:  Impression:  1. Low-grade right obstructive uropathy secondary to a 3-4 mm stone at the distal right ureter/UVJ  2. Multiple nonobstructing punctate calcifications kidneys bilaterally  3. 2.2 cm nodular spiculated soft tissue density posteromedial right lung base.  A neoplastic etiology must be considered  4. Tubal ligation clips at the lower pelvis      PATHOLOGY:  24 RLL Segmentectomy:  FINAL DIAGNOSIS   LUNG, RIGHT LOWER LOBE POSTERIOR SEGMENTECTOMY:   COLLOID ADENOCARCINOMA       Assessment:   pT1cNx Colloid Adenocarcinoma of the Lung - Will perform PET imaging now to rule out metastatic disease, if none found then recommend going for completion lobectomy with lymph node dissection. Role for only segmentectomy is only in tumors <2cm.     Heterozygous Factor V Leiden - No personal history or family history of thrombosis. Had 3 miscarriages which prompted workup in '07. No role for prophylaxis or anticoagulation.       Plan:   - PET/CT ordered today to rule out metastatic disease  - RTC 2-3 days after scan for MD visit, labs    I spent a total of 60 minutes on the day of the visit.This includes face to face  time and non-face to face time preparing to see the patient (eg, review of tests), obtaining and/or reviewing separately obtained history, documenting clinical information in the electronic or other health record, independently interpreting results and communicating results to the patient/family/caregiver, or care coordinator.      Elizabeth Kennedy LeJeune, MD  Hematology/Oncology   [facility]        Professional Services   I, Linda Don LPN, acted solely as a scribe for and in the presence of Dr. Elizabeth Kennedy LeJeune, who performed these services.

## 2024-08-01 ENCOUNTER — TELEPHONE (OUTPATIENT)
Dept: CARDIAC SURGERY | Facility: CLINIC | Age: 54
End: 2024-08-01
Payer: COMMERCIAL

## 2024-08-01 DIAGNOSIS — C80.1: ICD-10-CM

## 2024-08-01 DIAGNOSIS — R91.8 RIGHT LOWER LOBE LUNG MASS: Primary | ICD-10-CM

## 2024-08-01 NOTE — TELEPHONE ENCOUNTER
Pt wants to be referred to Dr. Qureshi, Oncologist instead of having to see Dr. Dominguez, she originally wanted Dr. Cantrell but since she is established in office with Dr. Dominguez she would have to see this physician.    ----- Message from Tosha Cortes sent at 8/1/2024  8:18 AM CDT -----  Patient called this morning asking to speak with you, Kylah    Call back # [phone]

## 2024-08-02 ENCOUNTER — TELEPHONE (OUTPATIENT)
Dept: HEMATOLOGY/ONCOLOGY | Facility: CLINIC | Age: 54
End: 2024-08-02
Payer: COMMERCIAL

## 2024-08-02 ENCOUNTER — OFFICE VISIT (OUTPATIENT)
Dept: HEMATOLOGY/ONCOLOGY | Facility: CLINIC | Age: 54
End: 2024-08-02
Payer: COMMERCIAL

## 2024-08-02 VITALS
HEIGHT: 67 IN | HEART RATE: 79 BPM | RESPIRATION RATE: 18 BRPM | WEIGHT: 133.5 LBS | OXYGEN SATURATION: 100 % | TEMPERATURE: 98 F | BODY MASS INDEX: 20.95 KG/M2 | DIASTOLIC BLOOD PRESSURE: 72 MMHG | SYSTOLIC BLOOD PRESSURE: 124 MMHG

## 2024-08-02 DIAGNOSIS — C80.1: ICD-10-CM

## 2024-08-02 DIAGNOSIS — D68.51 HETEROZYGOUS FACTOR V LEIDEN MUTATION: Primary | ICD-10-CM

## 2024-08-02 DIAGNOSIS — R91.8 RIGHT LOWER LOBE LUNG MASS: ICD-10-CM

## 2024-08-02 PROCEDURE — 99999 PR PBB SHADOW E&M-EST. PATIENT-LVL IV: CPT | Mod: PBBFAC,,, | Performed by: STUDENT IN AN ORGANIZED HEALTH CARE EDUCATION/TRAINING PROGRAM

## 2024-08-02 RX ORDER — ALBUTEROL SULFATE 90 UG/1
AEROSOL, METERED RESPIRATORY (INHALATION)
COMMUNITY
Start: 2024-08-01

## 2024-08-05 ENCOUNTER — TELEPHONE (OUTPATIENT)
Dept: CARDIAC SURGERY | Facility: CLINIC | Age: 54
End: 2024-08-05
Payer: COMMERCIAL

## 2024-08-12 ENCOUNTER — TELEPHONE (OUTPATIENT)
Dept: CARDIAC SURGERY | Facility: CLINIC | Age: 54
End: 2024-08-12
Payer: COMMERCIAL

## 2024-08-12 NOTE — TELEPHONE ENCOUNTER
Pt scheduled for PET on 8/16/24-wants to discuss a few things with Dr. Mcrae before having this PET.  Informed pt   Dr. Mcrae will be in clinic on Wednesday this week and will ask him to give her a call to discuss.  Verb understanding.   ----- Message from Krystina Coyle sent at 8/12/2024  8:33 AM CDT -----  Please call pt back, she wishes to discuss future appointments with you.      #[phone]

## 2024-08-16 ENCOUNTER — HOSPITAL ENCOUNTER (OUTPATIENT)
Dept: RADIOLOGY | Facility: HOSPITAL | Age: 54
Discharge: HOME | End: 2024-08-16
Attending: STUDENT IN AN ORGANIZED HEALTH CARE EDUCATION/TRAINING PROGRAM
Payer: COMMERCIAL

## 2024-08-16 DIAGNOSIS — C80.1: ICD-10-CM

## 2024-08-16 PROCEDURE — A9552 F18 FDG: HCPCS | Performed by: STUDENT IN AN ORGANIZED HEALTH CARE EDUCATION/TRAINING PROGRAM

## 2024-08-16 PROCEDURE — 78815 PET IMAGE W/CT SKULL-THIGH: CPT | Mod: TC

## 2024-08-16 RX ORDER — FLUDEOXYGLUCOSE F18 500 MCI/ML
10 INJECTION INTRAVENOUS
Status: COMPLETED | OUTPATIENT
Start: 2024-08-16 | End: 2024-08-16

## 2024-08-16 RX ADMIN — FLUDEOXYGLUCOSE F-18 10 MILLICURIE: 500 INJECTION INTRAVENOUS at 01:08

## 2024-08-20 NOTE — PROGRESS NOTES
"Subjective:       Patient ID: Trang Shelton is a 53 y.o. female.    Chief Complaint: Follow up    Diagnosis:   Heterozygous Factor V Leiden  T1c Right Lung Colloid Adenocarcinoma    Current Treatment: None    Treatment History: N/A    HPI:   52 yo presented in May '24 for evaluation of hypercoagulable disorder prior to lung biopsy/possible resection for lung mass. In 2007 after experiencing 3 miscarriages she had a hypercoag workup with  with GYN who told her she was at increased risk of blood clots. She became pregnant through IVF 2x and both pregnancies she took Heparin/Lovenox prophylactically without issue. She has no history of any thrombosis of any kind nor any family history of DVT/PE. She has had previous surgeries including breast impantation and removal without issue or additional anticoagulation. She has never taken anticoagulation outside of pregnancy. After review of her hypercoag workup done in 2007 she is negative for APLA, Prothrombin, and Antithrombin mutations/abnormalities. She is heterozygous for FVL. She has 2 other genetic abnormalities that reportedly "could be linked to increased thrombosis risk" however this is not clinically significant. Ultimately she was given clearance to proceed with her lung mass resection procedure without any limitations or restrictions.      She underwent segmentectomy with Dr. Mcrae on 7/1/24 where final pathology revealed 3cm colloid adenocarcinoma, G1, no LVI, no lymph nodes sampled. pT1cNx. She presents to medical oncology for further evaluation. CVT recommended completion segmentectomy with LN dissection.      Interval History:  Patient presents to clinic today for MD follow up appointment and labs to discuss scans and treatment plan.  She states she is feeling good today.  Discussed scan and treatment recommendation in detail with patient and her spouse.  She has no complaints. Denies any headaches, vision changes, shortness of breath.       Past " Medical History:   Diagnosis Date    Digestive disorder     GERD    Factor V Leiden     Kidney stone     Lung nodule     Thrombophilia       Past Surgical History:   Procedure Laterality Date    AUGMENTATION OF BREAST      BREAST SURGERY  2018     SECTION   and     COLONOSCOPY  2023    LASIK      SEGMENTECTOMY Right 2024    Procedure: SEGMENTECTOMY;  Surgeon: Gilberto Mcrae MD;  Location: [facility];  Service: Cardiothoracic;  Laterality: Right;  RIGHT LOWER LOBE POSTERIOR SEGMENTECTOMY    TUBAL LIGATION  2009    VIDEO-ASSISTED THORACOSCOPIC SURGERY (VATS) Right 2024    Procedure: VATS (VIDEO-ASSISTED THORACOSCOPIC SURGERY);  Surgeon: Gilberto Mcrae MD;  Location: [facility];  Service: Cardiothoracic;  Laterality: Right;     Social History     Socioeconomic History    Marital status:    Tobacco Use    Smoking status: Never    Smokeless tobacco: Never   Substance and Sexual Activity    Alcohol use: Never    Drug use: Never    Sexual activity: Yes     Partners: Male     Birth control/protection: Post-menopausal     Social Determinants of Health     Financial Resource Strain: Patient Declined (7/3/2024)    Overall Financial Resource Strain (CARDIA)     Difficulty of Paying Living Expenses: Patient declined   Food Insecurity: Patient Declined (7/3/2024)    Hunger Vital Sign     Worried About Running Out of Food in the Last Year: Patient declined     Ran Out of Food in the Last Year: Patient declined   Transportation Needs: Patient Declined (7/3/2024)    TRANSPORTATION NEEDS     Transportation : Patient declined   Stress: Patient Declined (7/3/2024)    [employer] - Occupational Stress Questionnaire     Feeling of Stress : Patient declined   Housing Stability: Patient Declined (7/3/2024)    Housing Stability Vital Sign     Unable to Pay for Housing in the Last Year: Patient declined     Homeless in the Last Year: Patient declined      Family History   Problem  Relation Name Age of Onset    Lymphoma Mother Diana Amor     Cancer Mother Diana Amor     Ovarian cancer Paternal Aunt      Stomach cancer Maternal Aunt      Diabetes Father Jorge Amor       Review of patient's allergies indicates:  No Known Allergies   Review of Systems   Constitutional:  Negative for appetite change and unexpected weight change.   HENT:  Negative for mouth sores.    Eyes:  Negative for visual disturbance.   Respiratory:  Negative for cough and shortness of breath.    Cardiovascular:  Negative for chest pain.   Gastrointestinal:  Negative for abdominal pain and diarrhea.   Genitourinary:  Negative for frequency.   Musculoskeletal:  Negative for back pain.   Integumentary:  Negative for rash.   Neurological:  Negative for headaches.   Hematological:  Negative for adenopathy.   Psychiatric/Behavioral:  The patient is not nervous/anxious.          Objective:      Vitals:    08/21/24 1110   BP: 115/74   Pulse: 74   Resp: 18   Temp: 98.3 °F (36.8 °C)           Physical Exam  Constitutional:       General: She is not in acute distress.     Appearance: Normal appearance. She is not ill-appearing.   HENT:      Head: Normocephalic and atraumatic.      Nose: Nose normal.      Mouth/Throat:      Mouth: Mucous membranes are moist.      Pharynx: Oropharynx is clear.   Eyes:      Extraocular Movements: Extraocular movements intact.      Conjunctiva/sclera: Conjunctivae normal.      Pupils: Pupils are equal, round, and reactive to light.   Cardiovascular:      Rate and Rhythm: Normal rate and regular rhythm.      Pulses: Normal pulses.      Heart sounds: Normal heart sounds. No murmur heard.  Pulmonary:      Effort: Pulmonary effort is normal. No respiratory distress.      Breath sounds: Normal breath sounds.   Abdominal:      General: There is no distension.      Palpations: Abdomen is soft.      Tenderness: There is no abdominal tenderness.   Musculoskeletal:         General: Normal range of motion.       Cervical back: Normal range of motion and neck supple.      Right lower leg: No edema.      Left lower leg: No edema.   Lymphadenopathy:      Cervical: No cervical adenopathy.   Skin:     General: Skin is warm and dry.   Neurological:      General: No focal deficit present.      Mental Status: She is alert and oriented to person, place, and time.         LABS AND IMAGING REVIEWED IN EPIC    IMAGIN24 PET:  IMPRESSION  1. Interval resection of right lower lobe soft tissue mass with postoperative scarring versus atelectasis.  2. No convincing evidence of hypermetabolic residual disease or FDG-avid distant metastasis.    24 CT Chest:  Impression:  1. 2.2 cm lobulated spiculated nodular focus posteromedial right lower lung.  A neoplastic etiology must be considered  2. Thoracic spondylosis with 8 mm sclerotic nodule at T9  3. 4 mm low-attenuation nodule lower left thyroid lobe    24 CT Renal Stone Study A/P:  Impression:  1. Low-grade right obstructive uropathy secondary to a 3-4 mm stone at the distal right ureter/UVJ  2. Multiple nonobstructing punctate calcifications kidneys bilaterally  3. 2.2 cm nodular spiculated soft tissue density posteromedial right lung base.  A neoplastic etiology must be considered  4. Tubal ligation clips at the lower pelvis      PATHOLOGY:  24 RLL Segmentectomy:  FINAL DIAGNOSIS   LUNG, RIGHT LOWER LOBE POSTERIOR SEGMENTECTOMY:   COLLOID ADENOCARCINOMA     Assessment:   pT1cNx Colloid Adenocarcinoma of the Lung - PET without evidence of metastatic disease therefore recommend completion lobectomy with lymph node sampling.  Role for only segmentectomy is only in tumors <2cm. Adjuvant therapy and imaging pending final pathology.     Heterozygous Factor V Leiden - No personal history or family history of thrombosis. Had 3 miscarriages which prompted workup in '07. No role for prophylaxis or anticoagulation.       Plan:   - Discussed scan results and recommendation for  completion lobectomy with lymph node sampling  - Follow up with Dr. Mcrae to discuss surgical resection  - RTC for MD visit pending surgery date, labs  - She is seeing Dr. Qureshi for second opinion      I spent a total of 35 minutes on the day of the visit.This includes face to face time and non-face to face time preparing to see the patient (eg, review of tests), obtaining and/or reviewing separately obtained history, documenting clinical information in the electronic or other health record, independently interpreting results and communicating results to the patient/family/caregiver, or care coordinator.      Elizabeth Kennedy LeJeune, MD  Hematology/Oncology   [facility]        Professional Services   I, Linda Don LPN, acted solely as a scribe for and in the presence of Dr. Elizabeth Kennedy LeJeune, who performed these services.

## 2024-08-21 ENCOUNTER — LAB VISIT (OUTPATIENT)
Dept: LAB | Facility: HOSPITAL | Age: 54
End: 2024-08-21
Attending: STUDENT IN AN ORGANIZED HEALTH CARE EDUCATION/TRAINING PROGRAM
Payer: COMMERCIAL

## 2024-08-21 ENCOUNTER — OFFICE VISIT (OUTPATIENT)
Dept: HEMATOLOGY/ONCOLOGY | Facility: CLINIC | Age: 54
End: 2024-08-21
Payer: COMMERCIAL

## 2024-08-21 VITALS
TEMPERATURE: 98 F | SYSTOLIC BLOOD PRESSURE: 115 MMHG | DIASTOLIC BLOOD PRESSURE: 74 MMHG | RESPIRATION RATE: 18 BRPM | WEIGHT: 129.31 LBS | BODY MASS INDEX: 20.29 KG/M2 | OXYGEN SATURATION: 98 % | HEART RATE: 74 BPM | HEIGHT: 67 IN

## 2024-08-21 DIAGNOSIS — C80.1: ICD-10-CM

## 2024-08-21 DIAGNOSIS — C80.1: Primary | ICD-10-CM

## 2024-08-21 LAB
ALBUMIN SERPL-MCNC: 4.1 G/DL (ref 3.5–5)
ALBUMIN/GLOB SERPL: 1.7 RATIO (ref 1.1–2)
ALP SERPL-CCNC: 100 UNIT/L (ref 40–150)
ALT SERPL-CCNC: 8 UNIT/L (ref 0–55)
ANION GAP SERPL CALC-SCNC: 7 MEQ/L
AST SERPL-CCNC: 15 UNIT/L (ref 5–34)
BASOPHILS # BLD AUTO: 0.05 X10(3)/MCL
BASOPHILS NFR BLD AUTO: 1.1 %
BILIRUB SERPL-MCNC: 1.8 MG/DL
BUN SERPL-MCNC: 18.8 MG/DL (ref 9.8–20.1)
CALCIUM SERPL-MCNC: 9.9 MG/DL (ref 8.4–10.2)
CHLORIDE SERPL-SCNC: 109 MMOL/L (ref 98–107)
CO2 SERPL-SCNC: 26 MMOL/L (ref 22–29)
CREAT SERPL-MCNC: 0.82 MG/DL (ref 0.55–1.02)
CREAT/UREA NIT SERPL: 23
EOSINOPHIL # BLD AUTO: 0.14 X10(3)/MCL (ref 0–0.9)
EOSINOPHIL NFR BLD AUTO: 2.9 %
ERYTHROCYTE [DISTWIDTH] IN BLOOD BY AUTOMATED COUNT: 12 % (ref 11.5–17)
GFR SERPLBLD CREATININE-BSD FMLA CKD-EPI: >60 ML/MIN/1.73/M2
GLOBULIN SER-MCNC: 2.4 GM/DL (ref 2.4–3.5)
GLUCOSE SERPL-MCNC: 88 MG/DL (ref 74–100)
HCT VFR BLD AUTO: 39 % (ref 37–47)
HGB BLD-MCNC: 12.9 G/DL (ref 12–16)
IMM GRANULOCYTES # BLD AUTO: 0 X10(3)/MCL (ref 0–0.04)
IMM GRANULOCYTES NFR BLD AUTO: 0 %
LYMPHOCYTES # BLD AUTO: 1.86 X10(3)/MCL (ref 0.6–4.6)
LYMPHOCYTES NFR BLD AUTO: 39.2 %
MCH RBC QN AUTO: 29.2 PG (ref 27–31)
MCHC RBC AUTO-ENTMCNC: 33.1 G/DL (ref 33–36)
MCV RBC AUTO: 88.2 FL (ref 80–94)
MONOCYTES # BLD AUTO: 0.48 X10(3)/MCL (ref 0.1–1.3)
MONOCYTES NFR BLD AUTO: 10.1 %
NEUTROPHILS # BLD AUTO: 2.22 X10(3)/MCL (ref 2.1–9.2)
NEUTROPHILS NFR BLD AUTO: 46.7 %
PLATELET # BLD AUTO: 237 X10(3)/MCL (ref 130–400)
PMV BLD AUTO: 9.4 FL (ref 7.4–10.4)
POTASSIUM SERPL-SCNC: 5.3 MMOL/L (ref 3.5–5.1)
PROT SERPL-MCNC: 6.5 GM/DL (ref 6.4–8.3)
RBC # BLD AUTO: 4.42 X10(6)/MCL (ref 4.2–5.4)
SODIUM SERPL-SCNC: 142 MMOL/L (ref 136–145)
WBC # BLD AUTO: 4.75 X10(3)/MCL (ref 4.5–11.5)

## 2024-08-21 PROCEDURE — 3078F DIAST BP <80 MM HG: CPT | Mod: CPTII,S$GLB,, | Performed by: STUDENT IN AN ORGANIZED HEALTH CARE EDUCATION/TRAINING PROGRAM

## 2024-08-21 PROCEDURE — 3008F BODY MASS INDEX DOCD: CPT | Mod: CPTII,S$GLB,, | Performed by: STUDENT IN AN ORGANIZED HEALTH CARE EDUCATION/TRAINING PROGRAM

## 2024-08-21 PROCEDURE — 1160F RVW MEDS BY RX/DR IN RCRD: CPT | Mod: CPTII,S$GLB,, | Performed by: STUDENT IN AN ORGANIZED HEALTH CARE EDUCATION/TRAINING PROGRAM

## 2024-08-21 PROCEDURE — 80053 COMPREHEN METABOLIC PANEL: CPT

## 2024-08-21 PROCEDURE — 99999 PR PBB SHADOW E&M-EST. PATIENT-LVL III: CPT | Mod: PBBFAC,,, | Performed by: STUDENT IN AN ORGANIZED HEALTH CARE EDUCATION/TRAINING PROGRAM

## 2024-08-21 PROCEDURE — 1159F MED LIST DOCD IN RCRD: CPT | Mod: CPTII,S$GLB,, | Performed by: STUDENT IN AN ORGANIZED HEALTH CARE EDUCATION/TRAINING PROGRAM

## 2024-08-21 PROCEDURE — 99214 OFFICE O/P EST MOD 30 MIN: CPT | Mod: S$GLB,,, | Performed by: STUDENT IN AN ORGANIZED HEALTH CARE EDUCATION/TRAINING PROGRAM

## 2024-08-21 PROCEDURE — 36415 COLL VENOUS BLD VENIPUNCTURE: CPT

## 2024-08-21 PROCEDURE — 85025 COMPLETE CBC W/AUTO DIFF WBC: CPT

## 2024-08-21 PROCEDURE — 3074F SYST BP LT 130 MM HG: CPT | Mod: CPTII,S$GLB,, | Performed by: STUDENT IN AN ORGANIZED HEALTH CARE EDUCATION/TRAINING PROGRAM

## 2024-09-04 ENCOUNTER — OFFICE VISIT (OUTPATIENT)
Dept: CARDIAC SURGERY | Facility: CLINIC | Age: 54
End: 2024-09-04
Payer: COMMERCIAL

## 2024-09-04 VITALS
HEIGHT: 67 IN | OXYGEN SATURATION: 99 % | DIASTOLIC BLOOD PRESSURE: 82 MMHG | BODY MASS INDEX: 20.37 KG/M2 | SYSTOLIC BLOOD PRESSURE: 128 MMHG | HEART RATE: 63 BPM | WEIGHT: 129.81 LBS

## 2024-09-04 DIAGNOSIS — R91.8 RIGHT LOWER LOBE LUNG MASS: Primary | ICD-10-CM

## 2024-09-04 DIAGNOSIS — C80.1: Primary | ICD-10-CM

## 2024-09-04 PROCEDURE — 3079F DIAST BP 80-89 MM HG: CPT | Mod: CPTII,,, | Performed by: THORACIC SURGERY (CARDIOTHORACIC VASCULAR SURGERY)

## 2024-09-04 PROCEDURE — 99024 POSTOP FOLLOW-UP VISIT: CPT | Mod: ,,, | Performed by: THORACIC SURGERY (CARDIOTHORACIC VASCULAR SURGERY)

## 2024-09-04 PROCEDURE — 3008F BODY MASS INDEX DOCD: CPT | Mod: CPTII,,, | Performed by: THORACIC SURGERY (CARDIOTHORACIC VASCULAR SURGERY)

## 2024-09-04 PROCEDURE — 3074F SYST BP LT 130 MM HG: CPT | Mod: CPTII,,, | Performed by: THORACIC SURGERY (CARDIOTHORACIC VASCULAR SURGERY)

## 2024-09-04 PROCEDURE — 1160F RVW MEDS BY RX/DR IN RCRD: CPT | Mod: CPTII,,, | Performed by: THORACIC SURGERY (CARDIOTHORACIC VASCULAR SURGERY)

## 2024-09-04 PROCEDURE — 1159F MED LIST DOCD IN RCRD: CPT | Mod: CPTII,,, | Performed by: THORACIC SURGERY (CARDIOTHORACIC VASCULAR SURGERY)

## 2024-09-04 NOTE — PROGRESS NOTES
History & Physical    SUBJECTIVE:     History of Present Illness:  The patient returns to the clinic to discuss completion lobectomy with lymph node dissection.  She has seen Dr. Santillan who agreed with the plan.      Chief Complaint   Patient presents with    Pre-op Exam     EST PT, F/U TO DISCUSS SURGERY-WANTS SURGERY ON 10/14/24-LOBECTOMY W/ LND. PET DONE 24, APPT WITH DR. MISHRA AND DR. CEE 24,       Review of patient's allergies indicates:  No Known Allergies    Current Outpatient Medications   Medication Sig Dispense Refill    albuterol (PROVENTIL/VENTOLIN HFA) 90 mcg/actuation inhaler Inhale into the lungs. (Patient not taking: Reported on 2024)      oxyCODONE (ROXICODONE) 5 MG immediate release tablet Take 1 tablet (5 mg total) by mouth every 6 (six) hours as needed for Pain. (Patient not taking: Reported on 2024) 20 tablet 0     No current facility-administered medications for this visit.       Past Medical History:   Diagnosis Date    Digestive disorder     GERD    Factor V Leiden     Kidney stone     Lung nodule     Thrombophilia      Past Surgical History:   Procedure Laterality Date    AUGMENTATION OF BREAST      BREAST SURGERY  2018     SECTION   and     COLONOSCOPY  2023    LASIK      SEGMENTECTOMY Right 2024    Procedure: SEGMENTECTOMY;  Surgeon: Gilberto Mcrae MD;  Location: [facility];  Service: Cardiothoracic;  Laterality: Right;  RIGHT LOWER LOBE POSTERIOR SEGMENTECTOMY    TUBAL LIGATION      VIDEO-ASSISTED THORACOSCOPIC SURGERY (VATS) Right 2024    Procedure: VATS (VIDEO-ASSISTED THORACOSCOPIC SURGERY);  Surgeon: Gilberto Mcrae MD;  Location: [facility];  Service: Cardiothoracic;  Laterality: Right;     Family History   Problem Relation Name Age of Onset    Lymphoma Mother Diana Mt     Cancer Mother Diana Mt     Ovarian cancer Paternal Aunt      Stomach cancer Maternal Aunt      Diabetes Father Jorge Wilmarmarylou      Social History  "    Tobacco Use    Smoking status: Never    Smokeless tobacco: Never   Substance Use Topics    Alcohol use: Never    Drug use: Never        Review of Systems:  Review of Systems   Constitutional: Negative.    HENT: Negative.     Eyes: Negative.    Respiratory: Negative.     Cardiovascular: Negative.    Gastrointestinal: Negative.    Endocrine: Negative.    Genitourinary: Negative.    Musculoskeletal: Negative.         Claudications   Skin: Negative.    Allergic/Immunologic: Negative.    Neurological: Negative.    Hematological: Negative.    Psychiatric/Behavioral: Negative.         OBJECTIVE:     Vital Signs (Most Recent)  Pulse: 63 (09/04/24 1034)  BP: 128/82 (09/04/24 1034)  SpO2: 99 % (09/04/24 1034)  5' 7" (1.702 m)  58.9 kg (129 lb 12.8 oz)     Physical Exam:  Physical Exam  Vitals reviewed.   Constitutional:       Appearance: Normal appearance.   HENT:      Head: Normocephalic and atraumatic.      Nose: Nose normal.      Mouth/Throat:      Mouth: Mucous membranes are dry.      Pharynx: Oropharynx is clear.   Eyes:      Extraocular Movements: Extraocular movements intact.      Conjunctiva/sclera: Conjunctivae normal.      Pupils: Pupils are equal, round, and reactive to light.   Cardiovascular:      Rate and Rhythm: Normal rate and regular rhythm.      Pulses: Normal pulses.   Pulmonary:      Effort: Pulmonary effort is normal.      Breath sounds: Normal breath sounds.   Abdominal:      General: Abdomen is flat.      Palpations: Abdomen is soft.   Musculoskeletal:         General: Normal range of motion.      Cervical back: Neck supple.   Skin:     General: Skin is warm and dry.   Neurological:      General: No focal deficit present.   Psychiatric:         Mood and Affect: Mood normal.         Laboratory:  None      Diagnostic Results:  None      ASSESSMENT/PLAN:     The risks and benefits of completion lobectomy have been explained to the patient .  She completely understands the risk of bleeding infection " myocardial infarction and prolonged air leak.  She elected to proceed

## 2024-10-15 ENCOUNTER — TELEPHONE (OUTPATIENT)
Dept: CARDIAC SURGERY | Facility: CLINIC | Age: 54
End: 2024-10-15
Payer: COMMERCIAL

## 2024-10-15 NOTE — TELEPHONE ENCOUNTER
Called pt after receiving message from auth team that pt no longer has insurance coverage.  Pt states she has changed jobs and has different insurance coverage.  Inst pt to email or stop by the office to give us the correct insurance information so we can work on getting insurance authorization for surgery.  Pt states she would send and notified auth dept of this.  Will await receipt of insurance information to imput into system.   
100

## 2024-10-28 ENCOUNTER — ANESTHESIA EVENT (OUTPATIENT)
Dept: SURGERY | Facility: HOSPITAL | Age: 54
End: 2024-10-28
Payer: COMMERCIAL

## 2024-11-05 ENCOUNTER — HOSPITAL ENCOUNTER (OUTPATIENT)
Dept: RADIOLOGY | Facility: HOSPITAL | Age: 54
Discharge: HOME | End: 2024-11-05
Attending: THORACIC SURGERY (CARDIOTHORACIC VASCULAR SURGERY)
Payer: COMMERCIAL

## 2024-11-05 DIAGNOSIS — R91.8 RIGHT LOWER LOBE LUNG MASS: ICD-10-CM

## 2024-11-05 PROCEDURE — 71046 X-RAY EXAM CHEST 2 VIEWS: CPT | Mod: TC

## 2024-11-11 NOTE — PRE-PROCEDURE INSTRUCTIONS
"[facility]: Outpatient Surgery  Preprocedure Check-In Instructions     Your physician's office will be calling you with your arrival time.   We ask patients to arrive about 2 hours before surgery to allow for enough time to review your health history & medications, start your IV, complete any outstanding labwork or tests, and meet your Anesthesiologist.    Expectations: "Because of inconsistent procedure completion times, an unexpected wait may occur. The Physicians would like you to be here to prepare in the event they run ahead of time. We will make you as comfortable as possible and keep you informed. We apologize in advance if this happens."    You will arrive at [facility], [address], [place].  Enter through the [place] entrance next to the Emergency Room, and come to the 6th floor to the Outpatient Surgery Department.     Visitory Policy:  You are allowed 2 adult visitors to be with you in the hospital. All hospital visitors should be in good current health.  No small children.     What to Bring:  Please have your ID, insurance cards, and all home medication bottles with you at check in.  Bring your CPAP machine if one is used at home.     Fasting:  Nothing to eat or drink after midnight the night before your procedure. This includes no ice, gum, hard candies, and/or tobacco products.     You may continue drinking water and gatorade until 4 am.       Follow your doctor's instructions for taking any medications on the morning of your procedure.  If no instructions for taking medications were given, do not take any medications but bring your medications in their bottles to your procedure check in.     Follow your doctor's preoperative instructions regarding skin prep, bowel prep, bathing, or showering prior to your procedure.  If any special soaps were provided to you, please use according to your doctor's instructions. If no instructions were given from your " doctor, take a good bath or shower with antibacterial soap the night before and the morning of your procedure.  On the morning of procedure, wear loose, comfortable clothing.  No lotions, makeup, perfumes, colognes, deodorant, or jewelry to your procedure.  Removable items (glasses, contact lenses, dentures, retainers, hearing aids) need to be removed for your procedure.  Bring your storage containers for these items if you wear them.     Artificial nails, body jewelry, eyelash extensions, and/or hair extensions with metal clips are not allowed during your surgery.  If you currently wear any of these items, please arrange for them to be removed prior to your arrival to the hospital.     Outpatient or Same Day Surgeries:  Any patients receiving sedation/anesthesia are advised not to drive for 24 hours after their procedure.  We do not allow patients to drive themselves home after discharge.  If you are going home after your procedure, please have someone available to drive you home from the hospital.        You may call the Outpatient Surgery Department at [phone] with any questions or concerns.  We are looking forward to meeting you and taking great care of you for your procedure.  Thank you for choosing [facility] for your surgical needs.

## 2024-11-12 ENCOUNTER — ANESTHESIA (OUTPATIENT)
Dept: SURGERY | Facility: HOSPITAL | Age: 54
End: 2024-11-12
Payer: COMMERCIAL

## 2024-11-12 ENCOUNTER — HOSPITAL ENCOUNTER (INPATIENT)
Facility: HOSPITAL | Age: 54
LOS: 4 days | Discharge: HOME | DRG: 164 | End: 2024-11-16
Attending: THORACIC SURGERY (CARDIOTHORACIC VASCULAR SURGERY) | Admitting: THORACIC SURGERY (CARDIOTHORACIC VASCULAR SURGERY)
Payer: COMMERCIAL

## 2024-11-12 DIAGNOSIS — R91.8 RIGHT LOWER LOBE LUNG MASS: Primary | ICD-10-CM

## 2024-11-12 PROCEDURE — 36000710: Performed by: THORACIC SURGERY (CARDIOTHORACIC VASCULAR SURGERY)

## 2024-11-12 PROCEDURE — 27201423 OPTIME MED/SURG SUP & DEVICES STERILE SUPPLY: Performed by: THORACIC SURGERY (CARDIOTHORACIC VASCULAR SURGERY)

## 2024-11-12 PROCEDURE — 99499 UNLISTED E&M SERVICE: CPT | Mod: ,,, | Performed by: PHYSICIAN ASSISTANT

## 2024-11-12 PROCEDURE — 63600175 PHARM REV CODE 636 W HCPCS: Performed by: THORACIC SURGERY (CARDIOTHORACIC VASCULAR SURGERY)

## 2024-11-12 PROCEDURE — 71000033 HC RECOVERY, INTIAL HOUR: Performed by: THORACIC SURGERY (CARDIOTHORACIC VASCULAR SURGERY)

## 2024-11-12 PROCEDURE — A4306 DRUG DELIVERY SYSTEM <=50 ML: HCPCS | Performed by: THORACIC SURGERY (CARDIOTHORACIC VASCULAR SURGERY)

## 2024-11-12 PROCEDURE — 63600175 PHARM REV CODE 636 W HCPCS: Performed by: ANESTHESIOLOGY

## 2024-11-12 PROCEDURE — C1729 CATH, DRAINAGE: HCPCS | Performed by: THORACIC SURGERY (CARDIOTHORACIC VASCULAR SURGERY)

## 2024-11-12 PROCEDURE — 25000003 PHARM REV CODE 250: Performed by: PHYSICIAN ASSISTANT

## 2024-11-12 PROCEDURE — 63600175 PHARM REV CODE 636 W HCPCS: Performed by: PHYSICIAN ASSISTANT

## 2024-11-12 PROCEDURE — 37000009 HC ANESTHESIA EA ADD 15 MINS: Performed by: THORACIC SURGERY (CARDIOTHORACIC VASCULAR SURGERY)

## 2024-11-12 PROCEDURE — 37000008 HC ANESTHESIA 1ST 15 MINUTES: Performed by: THORACIC SURGERY (CARDIOTHORACIC VASCULAR SURGERY)

## 2024-11-12 PROCEDURE — 38746 REMOVE THORACIC LYMPH NODES: CPT | Mod: RT,,, | Performed by: THORACIC SURGERY (CARDIOTHORACIC VASCULAR SURGERY)

## 2024-11-12 PROCEDURE — 36000711: Performed by: THORACIC SURGERY (CARDIOTHORACIC VASCULAR SURGERY)

## 2024-11-12 PROCEDURE — 0BTF0ZZ RESECTION OF RIGHT LOWER LUNG LOBE, OPEN APPROACH: ICD-10-PCS | Performed by: THORACIC SURGERY (CARDIOTHORACIC VASCULAR SURGERY)

## 2024-11-12 PROCEDURE — C1768 GRAFT, VASCULAR: HCPCS | Performed by: THORACIC SURGERY (CARDIOTHORACIC VASCULAR SURGERY)

## 2024-11-12 PROCEDURE — 27000221 HC OXYGEN, UP TO 24 HOURS

## 2024-11-12 PROCEDURE — 38746 REMOVE THORACIC LYMPH NODES: CPT | Mod: AS,RT,, | Performed by: PHYSICIAN ASSISTANT

## 2024-11-12 PROCEDURE — 32480 PARTIAL REMOVAL OF LUNG: CPT | Mod: AS,RT,, | Performed by: PHYSICIAN ASSISTANT

## 2024-11-12 PROCEDURE — 64461 PVB THORACIC SINGLE INJ SITE: CPT | Performed by: ANESTHESIOLOGY

## 2024-11-12 PROCEDURE — 32480 PARTIAL REMOVAL OF LUNG: CPT | Mod: RT,,, | Performed by: THORACIC SURGERY (CARDIOTHORACIC VASCULAR SURGERY)

## 2024-11-12 PROCEDURE — 94799 UNLISTED PULMONARY SVC/PX: CPT

## 2024-11-12 PROCEDURE — 63600175 PHARM REV CODE 636 W HCPCS: Mod: JZ,JG | Performed by: ANESTHESIOLOGY

## 2024-11-12 PROCEDURE — 63600175 PHARM REV CODE 636 W HCPCS

## 2024-11-12 PROCEDURE — 21400001 HC TELEMETRY ROOM

## 2024-11-12 PROCEDURE — C1894 INTRO/SHEATH, NON-LASER: HCPCS | Performed by: THORACIC SURGERY (CARDIOTHORACIC VASCULAR SURGERY)

## 2024-11-12 PROCEDURE — 99900035 HC TECH TIME PER 15 MIN (STAT)

## 2024-11-12 PROCEDURE — 11000001 HC ACUTE MED/SURG PRIVATE ROOM

## 2024-11-12 PROCEDURE — 0JH60VZ INSERTION OF INFUSION PUMP INTO CHEST SUBCUTANEOUS TISSUE AND FASCIA, OPEN APPROACH: ICD-10-PCS | Performed by: THORACIC SURGERY (CARDIOTHORACIC VASCULAR SURGERY)

## 2024-11-12 PROCEDURE — 25000003 PHARM REV CODE 250

## 2024-11-12 PROCEDURE — 07T70ZZ RESECTION OF THORAX LYMPHATIC, OPEN APPROACH: ICD-10-PCS | Performed by: THORACIC SURGERY (CARDIOTHORACIC VASCULAR SURGERY)

## 2024-11-12 PROCEDURE — 94760 N-INVAS EAR/PLS OXIMETRY 1: CPT

## 2024-11-12 RX ORDER — MIDAZOLAM HYDROCHLORIDE 1 MG/ML
2 INJECTION INTRAMUSCULAR; INTRAVENOUS ONCE
Status: COMPLETED | OUTPATIENT
Start: 2024-11-12 | End: 2024-11-12

## 2024-11-12 RX ORDER — LIDOCAINE HYDROCHLORIDE 20 MG/ML
INJECTION INTRAVENOUS
Status: DISCONTINUED | OUTPATIENT
Start: 2024-11-12 | End: 2024-11-12

## 2024-11-12 RX ORDER — PHENYLEPHRINE HYDROCHLORIDE 10 MG/ML
INJECTION INTRAVENOUS
Status: DISCONTINUED | OUTPATIENT
Start: 2024-11-12 | End: 2024-11-12

## 2024-11-12 RX ORDER — BUPIVACAINE HYDROCHLORIDE 5 MG/ML
15 INJECTION, SOLUTION EPIDURAL; INTRACAUDAL ONCE
Status: DISCONTINUED | OUTPATIENT
Start: 2024-11-12 | End: 2024-11-12 | Stop reason: CLARIF

## 2024-11-12 RX ORDER — PROPOFOL 10 MG/ML
VIAL (ML) INTRAVENOUS
Status: DISCONTINUED | OUTPATIENT
Start: 2024-11-12 | End: 2024-11-12

## 2024-11-12 RX ORDER — SODIUM CHLORIDE 0.9 % (FLUSH) 0.9 %
10 SYRINGE (ML) INJECTION
Status: DISCONTINUED | OUTPATIENT
Start: 2024-11-12 | End: 2024-11-12 | Stop reason: HOSPADM

## 2024-11-12 RX ORDER — ROPIVACAINE HYDROCHLORIDE 2 MG/ML
8 INJECTION, SOLUTION EPIDURAL; INFILTRATION CONTINUOUS
Status: DISCONTINUED | OUTPATIENT
Start: 2024-11-12 | End: 2024-11-16 | Stop reason: HOSPADM

## 2024-11-12 RX ORDER — FENTANYL CITRATE 50 UG/ML
INJECTION, SOLUTION INTRAMUSCULAR; INTRAVENOUS
Status: DISCONTINUED | OUTPATIENT
Start: 2024-11-12 | End: 2024-11-12

## 2024-11-12 RX ORDER — HYDROMORPHONE HYDROCHLORIDE 2 MG/ML
0.2 INJECTION, SOLUTION INTRAMUSCULAR; INTRAVENOUS; SUBCUTANEOUS EVERY 5 MIN PRN
Status: DISCONTINUED | OUTPATIENT
Start: 2024-11-12 | End: 2024-11-12 | Stop reason: HOSPADM

## 2024-11-12 RX ORDER — ROCURONIUM BROMIDE 10 MG/ML
INJECTION, SOLUTION INTRAVENOUS
Status: DISCONTINUED | OUTPATIENT
Start: 2024-11-12 | End: 2024-11-12

## 2024-11-12 RX ORDER — GLUCAGON 1 MG
1 KIT INJECTION
Status: DISCONTINUED | OUTPATIENT
Start: 2024-11-12 | End: 2024-11-12 | Stop reason: HOSPADM

## 2024-11-12 RX ORDER — ROPIVACAINE HYDROCHLORIDE 2 MG/ML
12 INJECTION, SOLUTION EPIDURAL; INFILTRATION CONTINUOUS
Status: DISCONTINUED | OUTPATIENT
Start: 2024-11-12 | End: 2024-11-16 | Stop reason: HOSPADM

## 2024-11-12 RX ORDER — METOCLOPRAMIDE HYDROCHLORIDE 5 MG/ML
5 INJECTION INTRAMUSCULAR; INTRAVENOUS EVERY 6 HOURS PRN
Status: DISCONTINUED | OUTPATIENT
Start: 2024-11-12 | End: 2024-11-16 | Stop reason: HOSPADM

## 2024-11-12 RX ORDER — BUPIVACAINE HYDROCHLORIDE 2.5 MG/ML
30 INJECTION, SOLUTION EPIDURAL; INFILTRATION; INTRACAUDAL ONCE
Status: COMPLETED | OUTPATIENT
Start: 2024-11-12 | End: 2024-11-12

## 2024-11-12 RX ORDER — ONDANSETRON HYDROCHLORIDE 2 MG/ML
4 INJECTION, SOLUTION INTRAVENOUS EVERY 12 HOURS PRN
Status: DISCONTINUED | OUTPATIENT
Start: 2024-11-12 | End: 2024-11-12 | Stop reason: SDUPTHER

## 2024-11-12 RX ORDER — BUPIVACAINE HYDROCHLORIDE 2.5 MG/ML
INJECTION, SOLUTION EPIDURAL; INFILTRATION; INTRACAUDAL
Status: COMPLETED | OUTPATIENT
Start: 2024-11-12 | End: 2024-11-12

## 2024-11-12 RX ORDER — ROPIVACAINE HYDROCHLORIDE 5 MG/ML
INJECTION, SOLUTION EPIDURAL; INFILTRATION; PERINEURAL
Status: DISCONTINUED | OUTPATIENT
Start: 2024-11-12 | End: 2024-11-12 | Stop reason: HOSPADM

## 2024-11-12 RX ORDER — DEXAMETHASONE SODIUM PHOSPHATE 4 MG/ML
INJECTION, SOLUTION INTRA-ARTICULAR; INTRALESIONAL; INTRAMUSCULAR; INTRAVENOUS; SOFT TISSUE
Status: DISCONTINUED | OUTPATIENT
Start: 2024-11-12 | End: 2024-11-12

## 2024-11-12 RX ORDER — ONDANSETRON HYDROCHLORIDE 2 MG/ML
4 INJECTION, SOLUTION INTRAVENOUS DAILY PRN
Status: DISCONTINUED | OUTPATIENT
Start: 2024-11-12 | End: 2024-11-12 | Stop reason: HOSPADM

## 2024-11-12 RX ORDER — ENOXAPARIN SODIUM 100 MG/ML
40 INJECTION SUBCUTANEOUS EVERY 24 HOURS
Status: DISCONTINUED | OUTPATIENT
Start: 2024-11-12 | End: 2024-11-16 | Stop reason: HOSPADM

## 2024-11-12 RX ORDER — CEFUROXIME SODIUM 1.5 G/16ML
1.5 INJECTION, POWDER, FOR SOLUTION INTRAVENOUS
Status: COMPLETED | OUTPATIENT
Start: 2024-11-12 | End: 2024-11-12

## 2024-11-12 RX ORDER — ONDANSETRON 4 MG/1
8 TABLET, ORALLY DISINTEGRATING ORAL EVERY 8 HOURS PRN
Status: DISCONTINUED | OUTPATIENT
Start: 2024-11-12 | End: 2024-11-16 | Stop reason: HOSPADM

## 2024-11-12 RX ORDER — EPHEDRINE SULFATE 50 MG/ML
INJECTION, SOLUTION INTRAVENOUS
Status: DISCONTINUED | OUTPATIENT
Start: 2024-11-12 | End: 2024-11-12

## 2024-11-12 RX ORDER — MEPERIDINE HYDROCHLORIDE 25 MG/ML
12.5 INJECTION INTRAMUSCULAR; INTRAVENOUS; SUBCUTANEOUS EVERY 10 MIN PRN
Status: DISCONTINUED | OUTPATIENT
Start: 2024-11-12 | End: 2024-11-12 | Stop reason: HOSPADM

## 2024-11-12 RX ORDER — MUPIROCIN 20 MG/G
1 OINTMENT TOPICAL 2 TIMES DAILY
Status: DISCONTINUED | OUTPATIENT
Start: 2024-11-12 | End: 2024-11-16 | Stop reason: HOSPADM

## 2024-11-12 RX ORDER — HYDROCODONE BITARTRATE AND ACETAMINOPHEN 5; 325 MG/1; MG/1
1 TABLET ORAL EVERY 4 HOURS PRN
Status: DISCONTINUED | OUTPATIENT
Start: 2024-11-12 | End: 2024-11-16 | Stop reason: HOSPADM

## 2024-11-12 RX ORDER — DOXYCYCLINE 100 MG/1
100 CAPSULE ORAL 2 TIMES DAILY
COMMUNITY
Start: 2024-11-06 | End: 2024-11-16

## 2024-11-12 RX ORDER — HYDROMORPHONE HYDROCHLORIDE 2 MG/ML
INJECTION, SOLUTION INTRAMUSCULAR; INTRAVENOUS; SUBCUTANEOUS
Status: DISCONTINUED | OUTPATIENT
Start: 2024-11-12 | End: 2024-11-12

## 2024-11-12 RX ORDER — BUPIVACAINE HYDROCHLORIDE 2.5 MG/ML
30 INJECTION, SOLUTION EPIDURAL; INFILTRATION; INTRACAUDAL ONCE
Status: DISCONTINUED | OUTPATIENT
Start: 2024-11-12 | End: 2024-11-12 | Stop reason: CLARIF

## 2024-11-12 RX ADMIN — PHENYLEPHRINE HYDROCHLORIDE 100 MCG: 10 INJECTION INTRAVENOUS at 10:11

## 2024-11-12 RX ADMIN — FENTANYL CITRATE 100 MCG: 50 INJECTION, SOLUTION INTRAMUSCULAR; INTRAVENOUS at 09:11

## 2024-11-12 RX ADMIN — DEXAMETHASONE SODIUM PHOSPHATE 8 MG: 4 INJECTION, SOLUTION INTRA-ARTICULAR; INTRALESIONAL; INTRAMUSCULAR; INTRAVENOUS; SOFT TISSUE at 09:11

## 2024-11-12 RX ADMIN — PROPOFOL 140 MG: 10 INJECTION, EMULSION INTRAVENOUS at 09:11

## 2024-11-12 RX ADMIN — CEFUROXIME 1.5 G: 1.5 INJECTION, POWDER, FOR SOLUTION INTRAVENOUS at 09:11

## 2024-11-12 RX ADMIN — PHENYLEPHRINE HYDROCHLORIDE 100 MCG: 10 INJECTION INTRAVENOUS at 09:11

## 2024-11-12 RX ADMIN — ROCURONIUM BROMIDE 80 MG: 10 SOLUTION INTRAVENOUS at 09:11

## 2024-11-12 RX ADMIN — HYDROMORPHONE HYDROCHLORIDE 1 MG: 2 INJECTION, SOLUTION INTRAMUSCULAR; INTRAVENOUS; SUBCUTANEOUS at 11:11

## 2024-11-12 RX ADMIN — LIDOCAINE HYDROCHLORIDE 60 MG: 20 INJECTION INTRAVENOUS at 09:11

## 2024-11-12 RX ADMIN — BUPIVACAINE HYDROCHLORIDE 20 ML: 2.5 INJECTION, SOLUTION EPIDURAL; INFILTRATION; INTRACAUDAL at 08:11

## 2024-11-12 RX ADMIN — ONDANSETRON 4 MG: 2 INJECTION INTRAMUSCULAR; INTRAVENOUS at 11:11

## 2024-11-12 RX ADMIN — HYDROCODONE BITARTRATE AND ACETAMINOPHEN 1 TABLET: 5; 325 TABLET ORAL at 04:11

## 2024-11-12 RX ADMIN — HYDROCODONE BITARTRATE AND ACETAMINOPHEN 1 TABLET: 5; 325 TABLET ORAL at 10:11

## 2024-11-12 RX ADMIN — SODIUM CHLORIDE: 9 INJECTION, SOLUTION INTRAVENOUS at 09:11

## 2024-11-12 RX ADMIN — SUGAMMADEX 200 MG: 100 INJECTION, SOLUTION INTRAVENOUS at 11:11

## 2024-11-12 RX ADMIN — ENOXAPARIN SODIUM 40 MG: 40 INJECTION SUBCUTANEOUS at 04:11

## 2024-11-12 RX ADMIN — EPHEDRINE SULFATE 5 MG: 50 INJECTION INTRAVENOUS at 11:11

## 2024-11-12 RX ADMIN — MIDAZOLAM HYDROCHLORIDE 2 MG: 1 INJECTION, SOLUTION INTRAMUSCULAR; INTRAVENOUS at 08:11

## 2024-11-12 RX ADMIN — BUPIVACAINE HYDROCHLORIDE 20 ML: 2.5 INJECTION, SOLUTION EPIDURAL; INFILTRATION; INTRACAUDAL; PERINEURAL at 08:11

## 2024-11-12 NOTE — PLAN OF CARE
11/12/24 1540   Discharge Assessment   Assessment Type Discharge Planning Assessment   Confirmed/corrected address, phone number and insurance Yes   Confirmed Demographics Correct on Facesheet   Source of Information patient;family   When was your last doctors appointment?   (approx 3 months ago)   Reason For Admission Lung Mass   People in Home child(ramon), dependent;spouse   Facility Arrived From: Home   Do you expect to return to your current living situation? Yes   Do you have help at home or someone to help you manage your care at home? Yes   Who are your caregiver(s) and their phone number(s)? Spouse, Melvin [phone]   Prior to hospitilization cognitive status: Unable to Assess   Current cognitive status: Alert/Oriented   Walking or Climbing Stairs Difficulty no   Dressing/Bathing Difficulty no   Home Accessibility wheelchair accessible   Home Layout Able to live on 1st floor   Equipment Currently Used at Home none   Patient currently being followed by outpatient case management? No   Do you currently have service(s) that help you manage your care at home? No   Do you take prescription medications? Yes   Do you have prescription coverage? Yes   Coverage BCBS   Do you have any problems affording any of your prescribed medications? No   Is the patient taking medications as prescribed? yes   Who is going to help you get home at discharge? Spouse, Emlvin   How do you get to doctors appointments? car, drives self;family or friend will provide   Are you on dialysis? No   Do you take coumadin? No   Discharge Plan A Home   Discharge Plan B Home Health   DME Needed Upon Discharge  none   Discharge Plan discussed with: Patient;Spouse/sig other   Name(s) and Number(s) Time Teare [phone]   Transition of Care Barriers None   Physical Activity   On average, how many days per week do you engage in moderate to strenuous exercise (like a brisk walk)? 0 days   On average, how many minutes do you engage in exercise at this  level? 0 min   Financial Resource Strain   How hard is it for you to pay for the very basics like food, housing, medical care, and heating? Not hard   Housing Stability   In the last 12 months, was there a time when you were not able to pay the mortgage or rent on time? N   At any time in the past 12 months, were you homeless or living in a shelter (including now)? N   Transportation Needs   Has the lack of transportation kept you from medical appointments, meetings, work or from getting things needed for daily living? No   Food Insecurity   Within the past 12 months, you worried that your food would run out before you got the money to buy more. Never true   Within the past 12 months, the food you bought just didn't last and you didn't have money to get more. Never true   Stress   Do you feel stress - tense, restless, nervous, or anxious, or unable to sleep at night because your mind is troubled all the time - these days? Pt Unable   Social Isolation   How often do you feel lonely or isolated from those around you?  Patient unable to answer   Alcohol Use   Q1: How often do you have a drink containing alcohol? Never   Q2: How many drinks containing alcohol do you have on a typical day when you are drinking? None   Q3: How often do you have six or more drinks on one occasion? Never   Utilities   In the past 12 months has the electric, gas, oil, or water company threatened to shut off services in your home? No   Health Literacy   How often do you need to have someone help you when you read instructions, pamphlets, or other written material from your doctor or pharmacy? Never   OTHER   Name(s) of People in Home Spouse, Melvin and 2 daughters     Assessment completed in patient's room.  Family at bedside.  Patient works and drives.  Lives with spouse, Melvin. And 2 daughters. Spouse can assist with care and transportation at discharge.   PCP-Karina Lockhart  Pharmacy-[facility]    No HH in the past  No DME in the  home    Discharge needs TBD at this time.  Will continue to follow

## 2024-11-12 NOTE — ANESTHESIA PROCEDURE NOTES
Arterial    Diagnosis: intraop    Patient location during procedure: done in OR  Timeout: 11/12/2024 8:50 AM  Procedure end time: 11/12/2024 9:28 AM    Staffing  Authorizing Provider: Astrid Moon MD  Performing Provider: Astrid Moon MD    Staffing  Performed by: Astrid Moon MD  Authorized by: Astrid Moon MD    Anesthesiologist was present at the time of the procedure.    Preanesthetic Checklist  Completed: patient identified, IV checked, site marked, risks and benefits discussed, surgical consent, monitors and equipment checked, pre-op evaluation, timeout performed and anesthesia consent givenArterial  Skin Prep: chlorhexidine gluconate  Local Infiltration: lidocaine  Orientation: left  Location: radial    Catheter placement by Ultrasound guidance. Heme positive aspiration all ports.   Vessel Caliber: small, patent, compressibility normal  Vascular Doppler:  not done  Needle advanced into vessel with real time Ultrasound guidance.  Guidewire confirmed in vessel.  Sterile sheath used.Insertion Attempts: 5 or more  Assessment  Dressing: secured with tape and tegaderm  Patient: Tolerated well  Additional Notes  Pt has factor V and would immediately clot off when catheter was advanced. Attempted in holding by student, then crna x 2, then in OR by anesthesiologist x 2. US guidance used

## 2024-11-12 NOTE — ANESTHESIA PROCEDURE NOTES
Intubation    Date/Time: 11/12/2024 9:12 AM    Performed by: Taj Mc  Authorized by: Astrid Moon MD    Intubation:     Induction:  Intravenous    Intubated:  Postinduction    Mask Ventilation:  Easy mask    Attempts:  1    Attempted By:  Student    Method of Intubation:  Video laryngoscopy    Blade:  Minaya 3    Laryngeal View Grade: Grade I - full view of cords      Difficult Airway Encountered?: Yes      Future Airway Recommendations:  Minaya    Complications:  None    Airway Device:  Double lumen tube left    Airway Device Size:  35F    Style/Cuff Inflation:  Cuffed    Tube secured:  30    Secured at:  The lips    Placement Verified By:  Capnometry    Complicating Factors:  Anterior larynx and small mouth    Findings Post-Intubation:  BS equal bilateral

## 2024-11-12 NOTE — H&P
[facility]  History & Physical  Cardiothoracic Surgery    SUBJECTIVE:     Chief Complaint/Reason for Admission: lung mass    History of Present Illness:  Patient is a 54 y.o. female presents with lung mass s/p vats  Need for lobectomy.      Family History of Heart Disease: no    No current facility-administered medications on file prior to encounter.     Current Outpatient Medications on File Prior to Encounter   Medication Sig Dispense Refill    albuterol (PROVENTIL/VENTOLIN HFA) 90 mcg/actuation inhaler Inhale into the lungs.      oxyCODONE (ROXICODONE) 5 MG immediate release tablet Take 1 tablet (5 mg total) by mouth every 6 (six) hours as needed for Pain. (Patient not taking: Reported on 2024) 20 tablet 0        Review of patient's allergies indicates:  No Known Allergies     Past Medical History:   Diagnosis Date    Digestive disorder     GERD    Factor V Leiden     Kidney stone     Lung nodule     Right lower lobe lung mass     Thrombophilia         Past Surgical History:   Procedure Laterality Date    AUGMENTATION OF BREAST      BREAST SURGERY  2018     SECTION   and     COLONOSCOPY  2023    LASIK      SEGMENTECTOMY Right 2024    Procedure: SEGMENTECTOMY;  Surgeon: Gilberto Mcrae MD;  Location: [facility];  Service: Cardiothoracic;  Laterality: Right;  RIGHT LOWER LOBE POSTERIOR SEGMENTECTOMY    TUBAL LIGATION      VIDEO-ASSISTED THORACOSCOPIC SURGERY (VATS) Right 2024    Procedure: VATS (VIDEO-ASSISTED THORACOSCOPIC SURGERY);  Surgeon: Gilberto Mcrae MD;  Location: [facility];  Service: Cardiothoracic;  Laterality: Right;           Review of Systems:  Review of Systems   All other systems reviewed and are negative.       OBJECTIVE:        Physical Exam:  Physical Exam     Laboratory:  I have reviewed all pertinent lab results within the past 24 hours.    Diagnostic Results:  CT: Reviewed          ASSESSMENT/PLAN:     A: Colloid   P:  Completion Lobectomy

## 2024-11-12 NOTE — OP NOTE
[facility]                       [address] [place] [address]    PATIENT NAME:      LUISANA ORONA  YOB: 1970  CSN:               [id number]  MRN:               [id number]  ADMIT DATE:        11/12/2024 06:19:00  PHYSICIAN:         Gilberto Mcrae MD                          OPERATIVE REPORT      DATE OF SURGERY:    11/12/2024 00:00:00    SURGEON:  Gilberto Mcrae MD    PREOPERATIVE DIAGNOSIS:  Right lower lobe lung cancer.    PROCEDURES:    1. Mini thoracotomy with right lower lobectomy.  2. Regional lymph node dissection.  3. On-Q pump placement.    POSTOPERATIVE DIAGNOSIS:  Right lower lobe lung cancer.  Negative margins.    ASSISTANT:  MICHAEL Devries.    BLOOD LOSS:  Minimal.    ANESTHESIA:  General.    TECHNIQUE:  Under informed consent, the patient was taken to OR in supine   position.  General anesthesia was induced and therefore maintained for remainder   of procedure.  Double-lumen endotracheal tube was placed.  The patient was then   turned in lateral decubitus position, exposing the right chest.  Skin of right   chest was prepped and draped in sterile fashion.  IV antibiotics were   administered.  Anesthesia placed the appropriate lines.  Posterolateral mini   thoracotomy incision was made, followed by taking down subcutaneous tissue.    Auscultatory triangle was penetrated.  Intercostal space #5 was penetrated,   small piece of rib #6 was resected.  Retractors were placed.  The fissures were   completed anteriorly with sharp dissection and posteriorly with staples, as   there was no definite fissure in that area.  Pulmonary artery was found at the   fissure and the pulmonary artery to the lower lobe was cut between endoscopic   staplers.  The inferior pulmonary vein was cut between staples, making sure I   saw the middle pulmonary vein.  At this time, the lung was only stuck at the    level of the bronchus.  Stapler was applied and prior to firing it, made sure   that the upper and middle lobe came up very nicely.  The specimen was taken out   and sent for pathology.  Frozen section came back negative on the margins.    Lymph nodes from level 4R, 7, 8R, 10R, and 11R were sampled.  Chest was filled   with water and Valsalva maneuver showed no evidence of air leak.  Chest tube was   placed and positioned in the apex, and it was secured to the skin.  A separate   small incision was made through the On-Q pump.  A tunneler was used and the   catheter was advanced through the introducer.  The introducer was removed and   the On-Q pump was secured to skin.  Pericostal sutures were placed and the   wounds were closed in layers of absorbable sutures for fascia, subcutaneous   tissue and skin.  The patient tolerated the procedure well.        ______________________________  MD GONZALO Bermudez/FRENCH  DD:  11/12/2024  Time:  01:23PM  DT:  11/12/2024  Time:  03:17PM  Job #:  [id number]      OPERATIVE REPORT

## 2024-11-12 NOTE — ANESTHESIA PROCEDURE NOTES
Peripheral Block    Patient location during procedure: pre-op   Block not for primary anesthetic.  Reason for block: at surgeon's request and post-op pain management   Post-op Pain Location: Right Chest pain   Start time: 11/12/2024 8:44 AM  Timeout: 11/12/2024 8:44 AM   End time: 11/12/2024 8:47 AM    Staffing  Authorizing Provider: Astrid Moon MD  Performing Provider: Astrid Moon MD    Staffing  Performed by: Astrid Moon MD  Authorized by: Astrid Moon MD    Preanesthetic Checklist  Completed: patient identified, IV checked, site marked, risks and benefits discussed, surgical consent, monitors and equipment checked, pre-op evaluation and timeout performed  Peripheral Block  Patient position: left lateral decubitus  Prep: ChloraPrep  Patient monitoring: heart rate, cardiac monitor, continuous pulse ox, continuous capnometry and frequent blood pressure checks  Block type: erector spinae plane  Laterality: right  Injection technique: single shot  Interspace: T6-7    Needle  Needle type: Stimuplex   Needle gauge: 22 G  Needle length: 3.5 in  Needle localization: anatomical landmarks and ultrasound guidance   -ultrasound image captured on disc.  Assessment  Injection assessment: negative aspiration, negative parasthesia and local visualized surrounding nerve  Paresthesia pain: none  Heart rate change: no  Slow fractionated injection: yes    Medications:    Medications: bupivacaine (pf) (MARCAINE) injection 0.25% - Perineural   20 mL - 11/12/2024 8:47:00 AM    Additional Notes  Added 10 cc Exparel to cblock. Wrist band applied. Patient tolerated well.  [facility]

## 2024-11-12 NOTE — ANESTHESIA PREPROCEDURE EVALUATION
11/12/2024  Trang Shelton is a 54 y.o., female.    Lung Mass- s/p VATS Bx 7/24  Past Medical History:   Diagnosis Date    Digestive disorder       GERD    Factor V Leiden      Kidney stone      Lung nodule      Right lower lobe lung mass      Thrombophilia        To have completion Lobectomy   Pre-op Assessment          Review of Systems         Anesthesia Plan  Type of Anesthesia, risks & benefits discussed:    Anesthesia Type: Gen ETT  Intra-op Monitoring Plan: Standard ASA Monitors and Art Line  Post Op Pain Control Plan: multimodal analgesia, IV/PO Opioids PRN and peripheral nerve block  Airway Plan: Direct, Post-Induction  Informed Consent: Informed consent signed with the Patient and all parties understand the risks and agree with anesthesia plan.  All questions answered. Patient consented to blood products? Yes  ASA Score: 3  Day of Surgery Review of History & Physical: H&P Update referred to the surgeon/provider.  Anesthesia Plan Notes: Right RAISA Block with Exparel    Ready For Surgery From Anesthesia Perspective.     .

## 2024-11-12 NOTE — ANESTHESIA PROCEDURE NOTES
Central Line    Diagnosis: Lung Mass  Patient location during procedure: done in OR  Procedure Urgency: Routine      Staffing  Authorizing Provider: Astrid Moon MD  Performing Provider: Astrid Moon MD    Staffing  Performed by: Astrid Moon MD  Authorized by: Astrid Moon MD    Anesthesiologist was present at the time of the procedure.  Preanesthetic Checklist  Completed: patient identified, risks and benefits discussed, monitors and equipment checked, timeout performed and anesthesia consent given  Indication   Indication: hemodynamic monitoring, vascular access, med administration     Anesthesia   general anesthesia    Central Line   Skin Prep: skin prepped with ChloraPrep, skin prep agent completely dried prior to procedure  Sterile Barriers Followed: Yes    All five maximal barriers used- gloves, gown, cap, mask, and large sterile sheet    hand hygiene performed prior to central venous catheter insertion  Location: right internal jugular.   Catheter type: triple lumen  Catheter Size: 7 Fr  Inserted Catheter Length: 16 cm  Ultrasound: vascular probe with ultrasound   Vessel Caliber: medium, patent, compressibility normal  Needle advanced into vessel with real time Ultrasound guidance.  Guidewire confirmed in vessel.  Image recorded and saved.  sterile gel and probe cover used in ultrasound-guided central venous catheter insertion  Manometry: none  Insertion Attempts: 1   Securement:line sutured, chlorhexidine patch, sterile dressing applied and blood return through all ports    Post-Procedure   X-Ray: successful placement   Adverse Events:none      Guidewire Guidewire removed intact.

## 2024-11-12 NOTE — TRANSFER OF CARE
"Anesthesia Transfer of Care Note    Patient: Trang Shelton    Procedure(s) Performed: Procedure(s) (LRB):  LOBECTOMY, LUNG, OPEN (Right)    Patient location: PACU    Anesthesia Type: general    Transport from OR: Transported from OR on room air with adequate spontaneous ventilation    Post pain: adequate analgesia    Post assessment: no apparent anesthetic complications and tolerated procedure well    Post vital signs: stable    Level of consciousness: sedated and responds to stimulation    Nausea/Vomiting: no nausea/vomiting    Complications: none    Transfer of care protocol was followed      Last vitals: Visit Vitals  /61   Pulse 68   Temp 36.9 °C (98.5 °F) (Oral)   Resp 18   Ht 5' 7" (1.702 m)   Wt 56.4 kg (124 lb 5.4 oz)   SpO2 98%   Breastfeeding No   BMI 19.47 kg/m²     "

## 2024-11-13 LAB
ALLENS TEST BLOOD GAS (OHS): ABNORMAL
BASE EXCESS BLD CALC-SCNC: 2.2 MMOL/L (ref -2–2)
BLOOD GAS SAMPLE TYPE (OHS): ABNORMAL
CA-I BLD-SCNC: 1.12 MMOL/L (ref 1.12–1.23)
CO2 BLDA-SCNC: 26 MMOL/L
COHGB MFR BLDA: 1.8 % (ref 0.5–1.5)
DRAWN BY BLOOD GAS (OHS): ABNORMAL
HCO3 BLDA-SCNC: 25 MMOL/L (ref 22–26)
INHALED O2 CONCENTRATION: 21 %
METHGB MFR BLDA: 0.7 % (ref 0.4–1.5)
O2 HB BLOOD GAS (OHS): 96.7 % (ref 94–97)
OXYHGB MFR BLDA: 11.8 G/DL (ref 12–16)
PCO2 BLDA: 32 MMHG (ref 35–45)
PH BLDA: 7.5 [PH] (ref 7.35–7.45)
PO2 BLDA: 83 MMHG (ref 80–100)
POCT GLUCOSE: 141 MG/DL (ref 70–110)
POTASSIUM BLOOD GAS (OHS): 3.6 MMOL/L (ref 3.5–5)
SAMPLE SITE BLOOD GAS (OHS): ABNORMAL
SAO2 % BLDA: 97.1 %
SODIUM BLOOD GAS (OHS): 134 MMOL/L (ref 137–145)

## 2024-11-13 PROCEDURE — 82803 BLOOD GASES ANY COMBINATION: CPT

## 2024-11-13 PROCEDURE — 99900031 HC PATIENT EDUCATION (STAT)

## 2024-11-13 PROCEDURE — 94760 N-INVAS EAR/PLS OXIMETRY 1: CPT

## 2024-11-13 PROCEDURE — 99900035 HC TECH TIME PER 15 MIN (STAT)

## 2024-11-13 PROCEDURE — 27000221 HC OXYGEN, UP TO 24 HOURS

## 2024-11-13 PROCEDURE — 21400001 HC TELEMETRY ROOM

## 2024-11-13 PROCEDURE — 36600 WITHDRAWAL OF ARTERIAL BLOOD: CPT

## 2024-11-13 PROCEDURE — 63600175 PHARM REV CODE 636 W HCPCS: Performed by: THORACIC SURGERY (CARDIOTHORACIC VASCULAR SURGERY)

## 2024-11-13 PROCEDURE — 94799 UNLISTED PULMONARY SVC/PX: CPT

## 2024-11-13 PROCEDURE — 25000003 PHARM REV CODE 250: Performed by: PHYSICIAN ASSISTANT

## 2024-11-13 PROCEDURE — 63600175 PHARM REV CODE 636 W HCPCS: Performed by: PHYSICIAN ASSISTANT

## 2024-11-13 RX ORDER — KETOROLAC TROMETHAMINE 30 MG/ML
30 INJECTION, SOLUTION INTRAMUSCULAR; INTRAVENOUS EVERY 6 HOURS
Status: DISCONTINUED | OUTPATIENT
Start: 2024-11-13 | End: 2024-11-13

## 2024-11-13 RX ORDER — KETOROLAC TROMETHAMINE 30 MG/ML
30 INJECTION, SOLUTION INTRAMUSCULAR; INTRAVENOUS EVERY 6 HOURS
Status: COMPLETED | OUTPATIENT
Start: 2024-11-13 | End: 2024-11-16

## 2024-11-13 RX ADMIN — HYDROCODONE BITARTRATE AND ACETAMINOPHEN 1 TABLET: 5; 325 TABLET ORAL at 05:11

## 2024-11-13 RX ADMIN — KETOROLAC TROMETHAMINE 30 MG: 30 INJECTION, SOLUTION INTRAMUSCULAR at 03:11

## 2024-11-13 RX ADMIN — HYDROCODONE BITARTRATE AND ACETAMINOPHEN 1 TABLET: 5; 325 TABLET ORAL at 09:11

## 2024-11-13 RX ADMIN — KETOROLAC TROMETHAMINE 30 MG: 30 INJECTION, SOLUTION INTRAMUSCULAR at 08:11

## 2024-11-13 RX ADMIN — ROPIVACAINE HYDROCHLORIDE 12 ML/HR: 2 INJECTION, SOLUTION EPIDURAL; INFILTRATION at 07:11

## 2024-11-13 RX ADMIN — ENOXAPARIN SODIUM 40 MG: 40 INJECTION SUBCUTANEOUS at 04:11

## 2024-11-13 RX ADMIN — ROPIVACAINE HYDROCHLORIDE 12 ML/HR: 2 INJECTION, SOLUTION EPIDURAL; INFILTRATION at 05:11

## 2024-11-13 RX ADMIN — MUPIROCIN 1 G: 20 OINTMENT TOPICAL at 08:11

## 2024-11-13 NOTE — PROGRESS NOTES
"Cardiothoracic Surgery   Daily Progress Note     HD#1  POD#1 Day Post-Op    Subjective  POD 1 s/p open R lower lobe lobectomy  Doing well this morning  Pain well controlled  On 2L O2 NC  Tolerating diet  Afebrile, VSS    Scheduled Meds:   enoxparin  40 mg Subcutaneous Daily    mupirocin  1 g Nasal BID    Perineural PUMP   Misc.(Non-Drug; Combo Route) Daily       Continuous Infusions:   Perineural PUMP   Misc.(Non-Drug; Combo Route) Continuous   1 each at 11/12/24 1254    ROPIvacaine (PF) 2 mg/ml (0.2%)  8 mL Perineural Continuous   Rate Change at 11/12/24 1254    ROPIvacaine (PF) 2 mg/ml (0.2%)  12 mL/hr Perineural Continuous 12 mL/hr at 11/13/24 0532 12 mL/hr at 11/13/24 0532       PRN Meds:  Current Facility-Administered Medications:     HYDROcodone-acetaminophen, 1 tablet, Oral, Q4H PRN    metoclopramide, 5 mg, Intravenous, Q6H PRN    ondansetron, 8 mg, Oral, Q8H PRN     Objective  Temp:  [97.5 °F (36.4 °C)-99 °F (37.2 °C)] 98.8 °F (37.1 °C)  Pulse:  [64-89] 74  Resp:  [14-22] 18  SpO2:  [98 %-100 %] 99 %  BP: (109-132)/(61-75) 122/70     I/O last 3 completed shifts:  In: 594 [P.O.:180; I.V.:14; IV Piggyback:400]  Out: 1265 [Urine:825; Chest Tube:440]  No intake/output data recorded.     GEN: NAD, sitting up in bed  NEURO: AAOx3  RESP: No increased WOB, on 2 L O2 NC, R CT in place with small air leak, serosanguinous output, Q pump catheter to right chest  CV: RR  ABD: S, NT, ND, no guarding or rebound  EXT: moving all spontaneously     Labs  No results for input(s): "WBC", "HGB", "HCT", "PLT", "PTT", "INR" in the last 72 hours.  No results for input(s): "NA", "K", "CL", "CO2", "BUN", "CREATININE", "GLU", "CALCIUM", "MG", "PHOS", "PROT", "ALBUMIN", "BILITOT", "AST", "ALKPHOS", "ALT" in the last 72 hours.    Imaging  X-Ray Chest 1 View   Final Result      No significant change as compared with the previous exam.      Persistent right apical pneumothorax         Electronically signed by: Gary Mares "   Date:    11/13/2024   Time:    05:56      X-Ray Chest AP Single View   Final Result      Small right pneumothorax.         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    Imaging reviewed. Small apical PTX. Small air leak.    Continue CT to suction  Continue Q pump catheter  I&Os  Daily CXR  MMPC  Diet  Out of bed as tolerated  IS x 10/hr  DVT ppx    Be Edwards MD  Surgery PGY 4      11/13/2024  7:07 AM

## 2024-11-14 PROCEDURE — 63600175 PHARM REV CODE 636 W HCPCS: Performed by: PHYSICIAN ASSISTANT

## 2024-11-14 PROCEDURE — 21400001 HC TELEMETRY ROOM

## 2024-11-14 PROCEDURE — 63600175 PHARM REV CODE 636 W HCPCS: Performed by: THORACIC SURGERY (CARDIOTHORACIC VASCULAR SURGERY)

## 2024-11-14 PROCEDURE — 94799 UNLISTED PULMONARY SVC/PX: CPT

## 2024-11-14 PROCEDURE — 25000003 PHARM REV CODE 250: Performed by: PHYSICIAN ASSISTANT

## 2024-11-14 RX ORDER — METHOCARBAMOL 500 MG/1
500 TABLET, FILM COATED ORAL 3 TIMES DAILY
Status: DISCONTINUED | OUTPATIENT
Start: 2024-11-14 | End: 2024-11-16 | Stop reason: HOSPADM

## 2024-11-14 RX ADMIN — KETOROLAC TROMETHAMINE 30 MG: 30 INJECTION, SOLUTION INTRAMUSCULAR at 03:11

## 2024-11-14 RX ADMIN — KETOROLAC TROMETHAMINE 30 MG: 30 INJECTION, SOLUTION INTRAMUSCULAR at 09:11

## 2024-11-14 RX ADMIN — METHOCARBAMOL 500 MG: 500 TABLET ORAL at 08:11

## 2024-11-14 RX ADMIN — MUPIROCIN 1 G: 20 OINTMENT TOPICAL at 08:11

## 2024-11-14 NOTE — PROGRESS NOTES
"Cardiothoracic Surgery   Daily Progress Note     HD#2  POD#2 Days Post-Op    Subjective  POD 2 s/p open R lower lobe lobectomy  Doing well this morning  Pain well controlled  On room air  Tolerating diet  Afebrile, VSS    Scheduled Meds:   enoxparin  40 mg Subcutaneous Daily    ketorolac  30 mg Intravenous Q6H    mupirocin  1 g Nasal BID    Perineural PUMP   Misc.(Non-Drug; Combo Route) Daily       Continuous Infusions:   Perineural PUMP   Misc.(Non-Drug; Combo Route) Continuous   1 each at 11/12/24 1254    ROPIvacaine (PF) 2 mg/ml (0.2%)  8 mL Perineural Continuous   Rate Change at 11/12/24 1254    ROPIvacaine (PF) 2 mg/ml (0.2%)  12 mL/hr Perineural Continuous 12 mL/hr at 11/13/24 1924 12 mL/hr at 11/13/24 1924       PRN Meds:  Current Facility-Administered Medications:     HYDROcodone-acetaminophen, 1 tablet, Oral, Q4H PRN    metoclopramide, 5 mg, Intravenous, Q6H PRN    ondansetron, 8 mg, Oral, Q8H PRN     Objective  Temp:  [97.9 °F (36.6 °C)-98.8 °F (37.1 °C)] 98.1 °F (36.7 °C)  Pulse:  [75-88] 75  Resp:  [18-20] 20  SpO2:  [96 %-100 %] 97 %  BP: (101-130)/(64-76) 101/64     I/O last 3 completed shifts:  In: 1074 [P.O.:660; I.V.:14; IV Piggyback:400]  Out: 1425 [Urine:825; Chest Tube:600]  I/O this shift:  In: -   Out: 50 [Chest Tube:50]     GEN: NAD, sitting up in bed  NEURO: AAOx3  RESP: No increased WOB, on RA, R CT in place with small air leak, serosanguinous output, Q pump catheter to right chest, incision c/d/i  CV: RR  ABD: S, NT, ND, no guarding or rebound  EXT: moving all spontaneously     Labs  No results for input(s): "WBC", "HGB", "HCT", "PLT", "PTT", "INR" in the last 72 hours.  No results for input(s): "NA", "K", "CL", "CO2", "BUN", "CREATININE", "GLU", "CALCIUM", "MG", "PHOS", "PROT", "ALBUMIN", "BILITOT", "AST", "ALKPHOS", "ALT" in the last 72 hours.    Imaging  X-Ray Chest 1 View   Final Result      Right pneumothorax mildly larger.         Electronically signed by: Rhett Palomino "   Date:    11/13/2024   Time:    11:19      X-Ray Chest 1 View   Final Result      No significant change as compared with the previous exam.      Persistent right apical pneumothorax         Electronically signed by: Gary Mares   Date:    11/13/2024   Time:    05:56      X-Ray Chest AP Single View   Final Result      Small right pneumothorax.         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    Imaging reviewed. Continued apical PTX and air leak.    Continue CT to suction  Continue Q pump catheter  I&Os  Daily CXR  MMPC  Diet  Out of bed as tolerated  IS x 10/hr  DVT ppx    Be Edwards MD  Surgery PGY 4      11/14/2024  7:07 AM

## 2024-11-15 LAB — PSYCHE PATHOLOGY RESULT: NORMAL

## 2024-11-15 PROCEDURE — 25000003 PHARM REV CODE 250: Performed by: PHYSICIAN ASSISTANT

## 2024-11-15 PROCEDURE — 21400001 HC TELEMETRY ROOM

## 2024-11-15 PROCEDURE — 63600175 PHARM REV CODE 636 W HCPCS: Performed by: THORACIC SURGERY (CARDIOTHORACIC VASCULAR SURGERY)

## 2024-11-15 PROCEDURE — 63600175 PHARM REV CODE 636 W HCPCS: Performed by: PHYSICIAN ASSISTANT

## 2024-11-15 PROCEDURE — 25000003 PHARM REV CODE 250: Performed by: THORACIC SURGERY (CARDIOTHORACIC VASCULAR SURGERY)

## 2024-11-15 RX ORDER — ACETAMINOPHEN 325 MG/1
650 TABLET ORAL EVERY 8 HOURS PRN
Status: DISCONTINUED | OUTPATIENT
Start: 2024-11-15 | End: 2024-11-16 | Stop reason: HOSPADM

## 2024-11-15 RX ORDER — ACETAMINOPHEN 500 MG
1000 TABLET ORAL EVERY 6 HOURS PRN
Status: DISCONTINUED | OUTPATIENT
Start: 2024-11-15 | End: 2024-11-15

## 2024-11-15 RX ADMIN — MUPIROCIN 1 G: 20 OINTMENT TOPICAL at 08:11

## 2024-11-15 RX ADMIN — ROPIVACAINE HYDROCHLORIDE 12 ML/HR: 2 INJECTION, SOLUTION EPIDURAL; INFILTRATION at 04:11

## 2024-11-15 RX ADMIN — KETOROLAC TROMETHAMINE 30 MG: 30 INJECTION, SOLUTION INTRAMUSCULAR at 08:11

## 2024-11-15 RX ADMIN — ROPIVACAINE HYDROCHLORIDE 12 ML/HR: 2 INJECTION, SOLUTION EPIDURAL; INFILTRATION at 01:11

## 2024-11-15 RX ADMIN — ENOXAPARIN SODIUM 40 MG: 40 INJECTION SUBCUTANEOUS at 04:11

## 2024-11-15 RX ADMIN — KETOROLAC TROMETHAMINE 30 MG: 30 INJECTION, SOLUTION INTRAMUSCULAR at 02:11

## 2024-11-15 RX ADMIN — ACETAMINOPHEN 1000 MG: 500 TABLET ORAL at 01:11

## 2024-11-15 RX ADMIN — KETOROLAC TROMETHAMINE 30 MG: 30 INJECTION, SOLUTION INTRAMUSCULAR at 03:11

## 2024-11-15 NOTE — PROGRESS NOTES
"Cardiothoracic Surgery   Daily Progress Note     HD#3  POD#3 Days Post-Op    Subjective  POD 3 s/p open R lower lobe lobectomy  No issues  Pain well controlled  On room air  Tolerating diet  Ambulatory  Afebrile, VSS    Scheduled Meds:   enoxparin  40 mg Subcutaneous Daily    ketorolac  30 mg Intravenous Q6H    methocarbamoL  500 mg Oral TID    mupirocin  1 g Nasal BID    Perineural PUMP   Misc.(Non-Drug; Combo Route) Daily       Continuous Infusions:   Perineural PUMP   Misc.(Non-Drug; Combo Route) Continuous   1 each at 11/12/24 1254    ROPIvacaine (PF) 2 mg/ml (0.2%)  8 mL Perineural Continuous   Rate Change at 11/12/24 1254    ROPIvacaine (PF) 2 mg/ml (0.2%)  12 mL/hr Perineural Continuous 12 mL/hr at 11/15/24 0122 12 mL/hr at 11/15/24 0122       PRN Meds:  Current Facility-Administered Medications:     HYDROcodone-acetaminophen, 1 tablet, Oral, Q4H PRN    metoclopramide, 5 mg, Intravenous, Q6H PRN    ondansetron, 8 mg, Oral, Q8H PRN     Objective  Temp:  [97.8 °F (36.6 °C)-98.3 °F (36.8 °C)] 98.2 °F (36.8 °C)  Pulse:  [] 66  Resp:  [11-18] 18  SpO2:  [97 %-100 %] 98 %  BP: (100-116)/(65-74) 114/72     I/O last 3 completed shifts:  In: 1560 [P.O.:1560]  Out: 280 [Chest Tube:280]  I/O this shift:  In: -   Out: 90 [Chest Tube:90]     GEN: NAD, sitting up in bed  NEURO: AAOx3  RESP: No increased WOB, on RA, R CT in place to water seal, serosanguinous output, Q pump catheter to right chest, incision c/d/i  CV: RR  ABD: S, NT, ND, no guarding or rebound  EXT: moving all spontaneously     Labs  No results for input(s): "WBC", "HGB", "HCT", "PLT", "PTT", "INR" in the last 72 hours.  No results for input(s): "NA", "K", "CL", "CO2", "BUN", "CREATININE", "GLU", "CALCIUM", "MG", "PHOS", "PROT", "ALBUMIN", "BILITOT", "AST", "ALKPHOS", "ALT" in the last 72 hours.    Imaging  X-Ray Chest 1 View   Final Result      Improved right apical pneumothorax.         Electronically signed by: Logan Clarke   Date:    11/14/2024 "   Time:    10:08      X-Ray Chest 1 View   Final Result      Right pneumothorax mildly larger.         Electronically signed by: Rhett Palomino   Date:    11/13/2024   Time:    11:19      X-Ray Chest 1 View   Final Result      No significant change as compared with the previous exam.      Persistent right apical pneumothorax         Electronically signed by: Gary Mares   Date:    11/13/2024   Time:    05:56      X-Ray Chest AP Single View   Final Result      Small right pneumothorax.         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    Imaging reviewed. Continued apical PTX, stable on water seal.      cc serosanguinous, small leak.    Continue CT to water seal  Continue Q pump catheter  I&Os  Daily CXR  MMPC  Diet  Out of bed as tolerated  IS x 10/hr  DVT ppx    Be Edwards MD  Surgery PGY 4      11/15/2024  7:07 AM

## 2024-11-15 NOTE — PLAN OF CARE
Rounded on pt, she is sitting up at bedside, has CT clamped. Pt states she is hoping to be dc home tomorrow. Pt states she has family support, denies need for HH or any dme. Pt states she has family to drive her home at dc.

## 2024-11-16 VITALS
SYSTOLIC BLOOD PRESSURE: 114 MMHG | HEIGHT: 67 IN | BODY MASS INDEX: 19.51 KG/M2 | DIASTOLIC BLOOD PRESSURE: 70 MMHG | TEMPERATURE: 99 F | WEIGHT: 124.31 LBS | HEART RATE: 72 BPM | RESPIRATION RATE: 18 BRPM | OXYGEN SATURATION: 97 %

## 2024-11-16 PROCEDURE — 94799 UNLISTED PULMONARY SVC/PX: CPT

## 2024-11-16 PROCEDURE — 99900031 HC PATIENT EDUCATION (STAT)

## 2024-11-16 PROCEDURE — 99024 POSTOP FOLLOW-UP VISIT: CPT | Mod: ,,, | Performed by: PHYSICIAN ASSISTANT

## 2024-11-16 PROCEDURE — 63600175 PHARM REV CODE 636 W HCPCS: Performed by: THORACIC SURGERY (CARDIOTHORACIC VASCULAR SURGERY)

## 2024-11-16 RX ORDER — HYDROCODONE BITARTRATE AND ACETAMINOPHEN 5; 325 MG/1; MG/1
1 TABLET ORAL EVERY 6 HOURS PRN
Qty: 20 TABLET | Refills: 0 | Status: SHIPPED | OUTPATIENT
Start: 2024-11-16

## 2024-11-16 RX ORDER — METHOCARBAMOL 500 MG/1
500 TABLET, FILM COATED ORAL 3 TIMES DAILY
Qty: 30 TABLET | Refills: 0 | Status: SHIPPED | OUTPATIENT
Start: 2024-11-16 | End: 2024-11-26

## 2024-11-16 RX ORDER — MELOXICAM 7.5 MG/1
7.5 TABLET ORAL DAILY
Qty: 10 TABLET | Refills: 0 | Status: SHIPPED | OUTPATIENT
Start: 2024-11-16

## 2024-11-16 RX ADMIN — KETOROLAC TROMETHAMINE 30 MG: 30 INJECTION, SOLUTION INTRAMUSCULAR at 08:11

## 2024-11-16 RX ADMIN — KETOROLAC TROMETHAMINE 30 MG: 30 INJECTION, SOLUTION INTRAMUSCULAR at 02:11

## 2024-11-16 NOTE — PROGRESS NOTES
Repeat CXR reviewed after chest tube removed. MICHAEL Calloway said patient is all clear for discharge

## 2024-11-16 NOTE — DISCHARGE SUMMARY
[facility] 6th Floor Medical Telemetry  Cardiothoracic Surgery  Discharge Summary      Patient Name: Trang Shelton  MRN: [id number]  Admission Date: 11/12/2024  Hospital Length of Stay: 4 days  Discharge Date and Time:  11/16/2024 8:55 AM  Attending Physician: Gilberto Mcrae MD   Discharging Provider: James Chauhan PA-C  Primary Care Provider: Karina Lockhart MD    HPI:   No notes on file    Procedure(s) (LRB):  LOBECTOMY, LUNG, OPEN (Right)      Indwelling Lines/Drains at time of discharge:   Lines/Drains/Airways       Central Venous Catheter Line  Duration             Percutaneous Central Line - Triple Lumen  11/12/24 0958 Internal Jugular Right 3 days              Drain  Duration                  Chest Tube 11/12/24 Tube - 1 Right Midaxillary 28 Fr. 4 days              Epidural Line  Duration                  Perineural Analgesia/Anesthesia Assessment (Motor Function-Bromage) 11/12/24 1133 3 days                  Hospital Course: No notes on file    Goals of Care Treatment Preferences:             Significant Diagnostic Studies: Radiology: X-Ray: CXR: X-Ray Chest 1 View (CXR):   Results for orders placed or performed during the hospital encounter of 11/12/24   X-Ray Chest 1 View    Narrative    EXAMINATION:  XR CHEST 1 VIEW    CLINICAL HISTORY:  chest tube;    TECHNIQUE:  One view    COMPARISON:  November 15, 2024.    FINDINGS:  Cardiopericardial silhouette is within normal limits.  There is no significant change right apical pneumothorax.  Right chest tube is in similar location.  Right hilar and perihilar operative changes.  No consolidation or pulmonary edema.      Impression    Right apical pneumothorax, unchanged.      Electronically signed by: Logan Clarke  Date:    11/16/2024  Time:    08:07       Pending Diagnostic Studies:       Procedure Component Value Units Date/Time    X-Ray Chest 1 View [[id number]]     Order Status: Sent Lab Status: No result             No new  Pt is requesting a freestyle meter lancets and strips through [employer]. Please advise and authorize.     Assessment & Plan notes have been filed under this hospital service since the last note was generated.  Service: Cardiovascular Surgery    Final Active Diagnoses:    Diagnosis Date Noted POA    PRINCIPAL PROBLEM:  Right lower lobe lung mass [R91.8] 07/04/2024 Yes      Problems Resolved During this Admission:      Discharged Condition: good    Disposition: Home or Self Care    Follow Up:    Patient Instructions:   No discharge procedures on file.  Medications:  Reconciled Home Medications:      Medication List        START taking these medications      HYDROcodone-acetaminophen 5-325 mg per tablet  Commonly known as: NORCO  Take 1 tablet by mouth every 6 (six) hours as needed for Pain.     meloxicam 7.5 MG tablet  Commonly known as: MOBIC  Take 1 tablet (7.5 mg total) by mouth once daily.     methocarbamoL 500 MG Tab  Commonly known as: ROBAXIN  Take 1 tablet (500 mg total) by mouth 3 (three) times daily. for 10 days            CONTINUE taking these medications      albuterol 90 mcg/actuation inhaler  Commonly known as: PROVENTIL/VENTOLIN HFA  Inhale into the lungs.     doxycycline 100 MG capsule  Commonly known as: MONODOX  Take 100 mg by mouth 2 (two) times daily.            STOP taking these medications      oxyCODONE 5 MG immediate release tablet  Commonly known as: ROXICODONE            Time spent on the discharge of patient: 30 minutes    James Chauhan PA-C  Cardiothoracic Surgery  [facility] - 6th Floor Medical Telemetry

## 2024-11-16 NOTE — PROGRESS NOTES
"CT SURGERY PROGRESS NOTE  Trang Shelton  54 y.o.  1970    Patients Procedure: Procedure(s) (LRB):  LOBECTOMY, LUNG, OPEN (Right)    Subjective  Interval History: POD 4    ROS    Medication List  Infusions   Perineural PUMP   Misc.(Non-Drug; Combo Route) Continuous   1 each at 11/12/24 1254    ROPIvacaine (PF) 2 mg/ml (0.2%)  8 mL Perineural Continuous   Rate Change at 11/12/24 1254    ROPIvacaine (PF) 2 mg/ml (0.2%)  12 mL/hr Perineural Continuous 12 mL/hr at 11/15/24 1626 12 mL/hr at 11/15/24 1626     Scheduled   enoxparin  40 mg Subcutaneous Daily    methocarbamoL  500 mg Oral TID    mupirocin  1 g Nasal BID    Perineural PUMP   Misc.(Non-Drug; Combo Route) Daily       Objective:  Recent Vitals:  Temp:  [97.3 °F (36.3 °C)-98.6 °F (37 °C)] 98.4 °F (36.9 °C)  Pulse:  [72-94] 77  Resp:  [16-23] 18  SpO2:  [97 %-99 %] 97 %  BP: ()/(55-70) 109/70    Physical Exam     I/O last 24 hrs:  Intake/Output - Last 3 Shifts         11/14 0700  11/15 0659 11/15 0700  11/16 0659 11/16 0700  11/17 0659    P.O. 1080 1560     Total Intake(mL/kg) 1080 (19.1) 1560 (27.7)     Urine (mL/kg/hr) 0 (0) 550 (0.4)     Stool  0     Chest Tube 160 0     Total Output 160 550     Net +920 +1010            Urine Occurrence 4 x 2 x     Stool Occurrence  0 x             Labs  BMP: No results for input(s): "GLU", "NA", "K", "CL", "CO2", "BUN", "CREATININE", "CALCIUM", "MG" in the last 48 hours.  CBC: No results for input(s): "WBC", "RBC", "HGB", "HCT", "PLT", "MCV", "MCH", "MCHC" in the last 48 hours.  CMP: No results for input(s): "GLU", "CALCIUM", "ALBUMIN", "PROT", "NA", "K", "CO2", "CL", "BUN", "CREATININE", "ALKPHOS", "ALT", "AST", "BILITOT" in the last 48 hours.      Imaging:   CXR: X-Ray Chest 1 View    Result Date: 11/16/2024  Right apical pneumothorax, unchanged. Electronically signed by: Logan Clarke Date:    11/16/2024 Time:    08:07         ASSESSMENT/PLAN:    POD 4  CXR unchanged   DC drain  DC home pm vs 24    Case and " plan of care discussed with MD James Chauhan PA-C

## 2024-11-19 ENCOUNTER — PATIENT OUTREACH (OUTPATIENT)
Dept: ADMINISTRATIVE | Facility: CLINIC | Age: 54
End: 2024-11-19
Payer: COMMERCIAL

## 2024-11-19 NOTE — PROGRESS NOTES
C3 nurse spoke with Trang Shelton  for a TCC post hospital discharge follow up call. The patient does not have a scheduled HOSFU appointment with Karina Lockhart MD  within 5-7 days post hospital discharge date 11/16/24. C3 nurse was unable to schedule HOSFU appointment in [facility].    Unable to route message to PCP staff.

## 2024-11-21 ENCOUNTER — TELEPHONE (OUTPATIENT)
Dept: CARDIAC SURGERY | Facility: CLINIC | Age: 54
End: 2024-11-21
Payer: COMMERCIAL

## 2024-11-21 NOTE — TELEPHONE ENCOUNTER
Pt to send pic to review and she will be in office on Tuesday for suture removal.  ----- Message from Issac sent at 11/21/2024  8:30 AM CST -----  Regarding: QUESTIONS  Contact: PT  PT STATED SHE CALLED YESTERDAY AND NEVER GOT A CALL BACK  RE:POST SX PROBLEMS -KNOT ON HER BACK  CONTACT PT #[phone]

## 2024-11-26 ENCOUNTER — CLINICAL SUPPORT (OUTPATIENT)
Dept: CARDIAC SURGERY | Facility: CLINIC | Age: 54
End: 2024-11-26
Payer: COMMERCIAL

## 2024-11-26 DIAGNOSIS — R05.8 DRY COUGH: ICD-10-CM

## 2024-11-26 DIAGNOSIS — R91.8 RIGHT LOWER LOBE LUNG MASS: Primary | ICD-10-CM

## 2024-11-26 RX ORDER — BENZONATATE 100 MG/1
100 CAPSULE ORAL 3 TIMES DAILY PRN
COMMUNITY
End: 2024-11-26 | Stop reason: SDUPTHER

## 2024-11-26 RX ORDER — BENZONATATE 100 MG/1
100 CAPSULE ORAL 3 TIMES DAILY PRN
Qty: 21 CAPSULE | Refills: 0 | Status: SHIPPED | OUTPATIENT
Start: 2024-11-26 | End: 2024-12-03

## 2024-11-26 NOTE — PHYSICIAN QUERY
Provider, please clarify if there is any clinical correlation between right pneumothorax. and Mini thoracotomy with right lower lobectomy.    Are the conditions:   Other explanation (please specify): Every Lobectomy can have PTX on CXR

## 2024-11-26 NOTE — PROGRESS NOTES
DR. SOL- 11/12/24 MINI THORACOTOMY, RIGHT LOWER LOBECTOMY.   Suture x 1 removed from rt side of chest.  Tolerated well.  No s/sx of infection or bleeding noted.

## 2024-12-02 DIAGNOSIS — C34.91 NON-SMALL CELL LUNG CANCER, RIGHT: Primary | ICD-10-CM

## 2024-12-09 ENCOUNTER — HOSPITAL ENCOUNTER (OUTPATIENT)
Dept: RADIOLOGY | Facility: HOSPITAL | Age: 54
Discharge: HOME | End: 2024-12-09
Attending: FAMILY MEDICINE
Payer: COMMERCIAL

## 2024-12-09 DIAGNOSIS — R50.9 FEVER, UNSPECIFIED: ICD-10-CM

## 2024-12-09 PROCEDURE — 71046 X-RAY EXAM CHEST 2 VIEWS: CPT | Mod: TC

## 2025-01-08 ENCOUNTER — OFFICE VISIT (OUTPATIENT)
Dept: CARDIAC SURGERY | Facility: CLINIC | Age: 55
End: 2025-01-08
Payer: COMMERCIAL

## 2025-01-08 VITALS
WEIGHT: 129 LBS | SYSTOLIC BLOOD PRESSURE: 125 MMHG | OXYGEN SATURATION: 100 % | DIASTOLIC BLOOD PRESSURE: 74 MMHG | HEART RATE: 97 BPM | BODY MASS INDEX: 20.2 KG/M2

## 2025-01-08 DIAGNOSIS — R91.1 LUNG NODULE: Primary | ICD-10-CM

## 2025-01-08 PROCEDURE — 1160F RVW MEDS BY RX/DR IN RCRD: CPT | Mod: CPTII,,, | Performed by: THORACIC SURGERY (CARDIOTHORACIC VASCULAR SURGERY)

## 2025-01-08 PROCEDURE — 1159F MED LIST DOCD IN RCRD: CPT | Mod: CPTII,,, | Performed by: THORACIC SURGERY (CARDIOTHORACIC VASCULAR SURGERY)

## 2025-01-08 PROCEDURE — 3074F SYST BP LT 130 MM HG: CPT | Mod: CPTII,,, | Performed by: THORACIC SURGERY (CARDIOTHORACIC VASCULAR SURGERY)

## 2025-01-08 PROCEDURE — 99024 POSTOP FOLLOW-UP VISIT: CPT | Mod: ,,, | Performed by: THORACIC SURGERY (CARDIOTHORACIC VASCULAR SURGERY)

## 2025-01-08 PROCEDURE — 3078F DIAST BP <80 MM HG: CPT | Mod: CPTII,,, | Performed by: THORACIC SURGERY (CARDIOTHORACIC VASCULAR SURGERY)

## 2025-01-08 RX ORDER — GABAPENTIN 100 MG/1
100 CAPSULE ORAL 3 TIMES DAILY
COMMUNITY

## 2025-01-08 NOTE — PROGRESS NOTES
Trang Shelton is a 54 y.o. female patient.   No diagnosis found.  Past Medical History:   Diagnosis Date    Digestive disorder     GERD    Factor V Leiden     Kidney stone     Lung nodule     Right lower lobe lung mass     Thrombophilia      Past Surgical History Pertinent Negatives:   Procedure Date Noted    ADENOIDECTOMY 05/28/2024    APPENDECTOMY 05/28/2024    BRAIN SURGERY 05/28/2024    CARDIAC VALVE REPLACEMENT 05/28/2024    CHOLECYSTECTOMY 05/28/2024    COLON SURGERY 05/28/2024    CORONARY ARTERY BYPASS GRAFT 05/28/2024    COSMETIC SURGERY 05/28/2024    EYE SURGERY 05/28/2024    FRACTURE SURGERY 05/28/2024    HERNIA REPAIR 05/28/2024    HYSTERECTOMY 05/28/2024    JOINT REPLACEMENT 05/28/2024    KIDNEY TRANSPLANT 05/28/2024    LIVER TRANSPLANT 05/28/2024    PROSTATE SURGERY 05/28/2024    SMALL INTESTINE SURGERY 05/28/2024    SPINE SURGERY 05/28/2024    TONSILLECTOMY 05/28/2024    VASECTOMY 05/28/2024     Scheduled Meds:  Continuous Infusions:  PRN Meds:    Review of patient's allergies indicates:  No Known Allergies  There are no hospital problems to display for this patient.    Blood pressure 125/74, pulse 97, weight 58.5 kg (129 lb), SpO2 100%.    Subjective:  The patient returns to clinic status post completion lobectomy.  She has been doing well with no complaints.      Objective:  Her wounds have healed well.      Assessment & Plan:  Overall doing very well RTC p.r.n.      Gilberto Mcrae MD  1/8/2025

## 2025-05-23 ENCOUNTER — HOSPITAL ENCOUNTER (OUTPATIENT)
Dept: RADIOLOGY | Facility: HOSPITAL | Age: 55
Discharge: HOME | End: 2025-05-23
Attending: FAMILY MEDICINE
Payer: COMMERCIAL

## 2025-05-23 DIAGNOSIS — R13.10 DYSPHAGIA, UNSPECIFIED: ICD-10-CM

## 2025-05-23 PROCEDURE — A9698 NON-RAD CONTRAST MATERIALNOC: HCPCS | Performed by: FAMILY MEDICINE

## 2025-05-23 PROCEDURE — 74220 X-RAY XM ESOPHAGUS 1CNTRST: CPT | Mod: TC

## 2025-05-23 PROCEDURE — 25500020 PHARM REV CODE 255: Performed by: FAMILY MEDICINE

## 2025-05-23 RX ADMIN — BARIUM SULFATE 135 ML: 980 POWDER, FOR SUSPENSION ORAL at 02:05

## 2025-06-19 DIAGNOSIS — R22.1 NECK MASS: Primary | ICD-10-CM

## 2025-06-23 ENCOUNTER — HOSPITAL ENCOUNTER (OUTPATIENT)
Dept: RADIOLOGY | Facility: HOSPITAL | Age: 55
Discharge: HOME | End: 2025-06-23
Attending: FAMILY MEDICINE
Payer: COMMERCIAL

## 2025-06-23 DIAGNOSIS — R22.1 NECK MASS: ICD-10-CM

## 2025-06-23 PROCEDURE — 76536 US EXAM OF HEAD AND NECK: CPT | Mod: TC

## (undated) DEVICE — ELECTRODE PATIENT RETURN DISP

## (undated) DEVICE — DRESSING TELFA N ADH 3X8

## (undated) DEVICE — CATH ON-Q EXPANSION 5

## (undated) DEVICE — TRAY CATH FOL SIL URIMTR 16FR

## (undated) DEVICE — IRRIGATOR HYDRO-SURG PLUS 5MM

## (undated) DEVICE — SUT PROLENE 4-0 RB-1 BL MO

## (undated) DEVICE — CATH THORACIC 28FR ST

## (undated) DEVICE — SOL IRRI STRL WATER 1000ML

## (undated) DEVICE — GLOVE PROTEXIS HYDROGEL SZ6.5

## (undated) DEVICE — BAG SPEC RETRV ENDO 4X6IN DISP

## (undated) DEVICE — SUT MONOCRYL PLUS UD 3-0 27

## (undated) DEVICE — RELOAD ENDO GIA TRISTAPLE 45MM

## (undated) DEVICE — GOWN SMARTSLEEVE AAMI LVL4 XXL

## (undated) DEVICE — KIT SURGICAL TURNOVER

## (undated) DEVICE — DRAPE FLUID WARMER 44X44IN

## (undated) DEVICE — DRESSING TELFA + RECT 6X10IN

## (undated) DEVICE — STAPLER ENDO GIA ULT UNIV 12MM

## (undated) DEVICE — DRESSING TELFA + BARR 4X6IN

## (undated) DEVICE — PAD DERMAPROX XL 22X14X.5IN

## (undated) DEVICE — SUT VICRYL 1 OB 36 CTX

## (undated) DEVICE — GLOVE PROTEXIS NEOPRN SZ8

## (undated) DEVICE — GLOVE PROTEXIS PI SYN SURG 7.5

## (undated) DEVICE — SUT PROLENE 5-0 36IN C-1

## (undated) DEVICE — SPONGE LAP 18X18 PREWASHED

## (undated) DEVICE — STAPLE TRI-STAPLE SUL 60MM 2.0

## (undated) DEVICE — SOL CLEARIFY VISUALIZATION LAP

## (undated) DEVICE — BAG TISS RETRV MONARCH 10MM

## (undated) DEVICE — DRAPE STERI INCISE 23X23IN

## (undated) DEVICE — TROCAR ENDOPATH EXCEL DILATING

## (undated) DEVICE — Device

## (undated) DEVICE — SOL NACL IRR 1000ML BTL

## (undated) DEVICE — RELOAD TRI-STAPLE 2.0 CRV 45MM

## (undated) DEVICE — TUBE SUCTION MEDI-VAC STERILE

## (undated) DEVICE — GLOVE BIOGEL 7.5

## (undated) DEVICE — PLEDGET TFLN FELT 9.5X4.8 ST

## (undated) DEVICE — PENCIL ELECSURG ROCKER 15FT

## (undated) DEVICE — STAPLE TRI-STAPLE 2.0 SUL 60MM

## (undated) DEVICE — CORD LAP 10 DISP

## (undated) DEVICE — CUSHION  WC FOAM 20X20X.75IN

## (undated) DEVICE — SUT VICRYL 2-0 36 CT-1

## (undated) DEVICE — SPONGE GAUZE 16PLY 4X4

## (undated) DEVICE — CLIP LIGATING MEDIUM

## (undated) DEVICE — SUT ETHBND XTRA 1 OS-8 30IN

## (undated) DEVICE — KIT ANTIFOG W/SPONG & FLUID

## (undated) DEVICE — TUNNELER SHEATH ON-Q 11GX8IN

## (undated) DEVICE — DRAIN CHEST DRY SUCTION

## (undated) DEVICE — GLOVE PROTEXIS BLUE LATEX 7

## (undated) DEVICE — SCISSOR CURVED ENDOPATH 5MM

## (undated) DEVICE — ELECTRODE EXTENDED BLADE

## (undated) DEVICE — SUT VICRYL #2 TP-1 2-27IN

## (undated) DEVICE — PENCIL E-Z CLEAN ROCKER 15FT

## (undated) DEVICE — STAPLE TRI-STAPLE 2.0 45MM BLK

## (undated) DEVICE — SUT SILK 2.0 BLK 18

## (undated) DEVICE — PATCH SEALANT TACHOSIL 4.8X4.8

## (undated) DEVICE — GLOVE SURG BIOGEL LATEX SZ 7.5

## (undated) DEVICE — RELOAD TRI-STAPLE 2.0 30MM